# Patient Record
Sex: MALE | Race: WHITE | NOT HISPANIC OR LATINO | URBAN - NONMETROPOLITAN AREA
[De-identification: names, ages, dates, MRNs, and addresses within clinical notes are randomized per-mention and may not be internally consistent; named-entity substitution may affect disease eponyms.]

---

## 2022-05-27 ENCOUNTER — APPOINTMENT (OUTPATIENT)
Dept: ULTRASOUND IMAGING | Facility: HOSPITAL | Age: 27
End: 2022-05-27

## 2022-05-27 ENCOUNTER — HOSPITAL ENCOUNTER (EMERGENCY)
Facility: HOSPITAL | Age: 27
Discharge: HOME | End: 2022-05-27
Attending: STUDENT IN AN ORGANIZED HEALTH CARE EDUCATION/TRAINING PROGRAM | Admitting: STUDENT IN AN ORGANIZED HEALTH CARE EDUCATION/TRAINING PROGRAM

## 2022-05-27 VITALS
DIASTOLIC BLOOD PRESSURE: 85 MMHG | HEART RATE: 86 BPM | HEIGHT: 69 IN | RESPIRATION RATE: 18 BRPM | BODY MASS INDEX: 37.03 KG/M2 | TEMPERATURE: 98.1 F | WEIGHT: 250 LBS | SYSTOLIC BLOOD PRESSURE: 134 MMHG | OXYGEN SATURATION: 96 %

## 2022-05-27 DIAGNOSIS — N50.812 PAIN IN LEFT TESTICLE: Primary | ICD-10-CM

## 2022-05-27 LAB
ANION GAP SERPL CALCULATED.3IONS-SCNC: 12 MMOL/L (ref 5–15)
BASOPHILS # BLD AUTO: 0.05 10*3/MM3 (ref 0–0.2)
BASOPHILS NFR BLD AUTO: 0.5 % (ref 0–1.5)
BILIRUB UR QL STRIP: NEGATIVE
BUN SERPL-MCNC: 18 MG/DL (ref 6–20)
BUN/CREAT SERPL: 17.5 (ref 7–25)
CALCIUM SPEC-SCNC: 9.5 MG/DL (ref 8.6–10.5)
CHLORIDE SERPL-SCNC: 104 MMOL/L (ref 98–107)
CLARITY UR: ABNORMAL
CO2 SERPL-SCNC: 25 MMOL/L (ref 22–29)
COLOR UR: YELLOW
CREAT SERPL-MCNC: 1.03 MG/DL (ref 0.76–1.27)
DEPRECATED RDW RBC AUTO: 38.6 FL (ref 37–54)
EGFRCR SERPLBLD CKD-EPI 2021: 101.5 ML/MIN/1.73
EOSINOPHIL # BLD AUTO: 0.25 10*3/MM3 (ref 0–0.4)
EOSINOPHIL NFR BLD AUTO: 2.4 % (ref 0.3–6.2)
ERYTHROCYTE [DISTWIDTH] IN BLOOD BY AUTOMATED COUNT: 12.8 % (ref 12.3–15.4)
GLUCOSE SERPL-MCNC: 95 MG/DL (ref 65–99)
GLUCOSE UR STRIP-MCNC: NEGATIVE MG/DL
HCT VFR BLD AUTO: 47.2 % (ref 37.5–51)
HGB BLD-MCNC: 16.2 G/DL (ref 13–17.7)
HGB UR QL STRIP.AUTO: NEGATIVE
HOLD SPECIMEN: NORMAL
IMM GRANULOCYTES # BLD AUTO: 0.04 10*3/MM3 (ref 0–0.05)
IMM GRANULOCYTES NFR BLD AUTO: 0.4 % (ref 0–0.5)
KETONES UR QL STRIP: NEGATIVE
LEUKOCYTE ESTERASE UR QL STRIP.AUTO: NEGATIVE
LYMPHOCYTES # BLD AUTO: 3.4 10*3/MM3 (ref 0.7–3.1)
LYMPHOCYTES NFR BLD AUTO: 32.1 % (ref 19.6–45.3)
MCH RBC QN AUTO: 28.7 PG (ref 26.6–33)
MCHC RBC AUTO-ENTMCNC: 34.3 G/DL (ref 31.5–35.7)
MCV RBC AUTO: 83.7 FL (ref 79–97)
MONOCYTES # BLD AUTO: 0.78 10*3/MM3 (ref 0.1–0.9)
MONOCYTES NFR BLD AUTO: 7.4 % (ref 5–12)
NEUTROPHILS NFR BLD AUTO: 57.2 % (ref 42.7–76)
NEUTROPHILS NFR BLD AUTO: 6.08 10*3/MM3 (ref 1.7–7)
NITRITE UR QL STRIP: NEGATIVE
NRBC BLD AUTO-RTO: 0 /100 WBC (ref 0–0.2)
PH UR STRIP.AUTO: 7.5 [PH] (ref 5–9)
PLATELET # BLD AUTO: 325 10*3/MM3 (ref 140–450)
PMV BLD AUTO: 10.2 FL (ref 6–12)
POTASSIUM SERPL-SCNC: 3.8 MMOL/L (ref 3.5–5.2)
PROT UR QL STRIP: NEGATIVE
RBC # BLD AUTO: 5.64 10*6/MM3 (ref 4.14–5.8)
SODIUM SERPL-SCNC: 141 MMOL/L (ref 136–145)
SP GR UR STRIP: 1.02 (ref 1–1.03)
UROBILINOGEN UR QL STRIP: ABNORMAL
WBC NRBC COR # BLD: 10.6 10*3/MM3 (ref 3.4–10.8)

## 2022-05-27 PROCEDURE — 93976 VASCULAR STUDY: CPT

## 2022-05-27 PROCEDURE — 87491 CHLMYD TRACH DNA AMP PROBE: CPT | Performed by: STUDENT IN AN ORGANIZED HEALTH CARE EDUCATION/TRAINING PROGRAM

## 2022-05-27 PROCEDURE — 87661 TRICHOMONAS VAGINALIS AMPLIF: CPT | Performed by: STUDENT IN AN ORGANIZED HEALTH CARE EDUCATION/TRAINING PROGRAM

## 2022-05-27 PROCEDURE — 96374 THER/PROPH/DIAG INJ IV PUSH: CPT

## 2022-05-27 PROCEDURE — 87591 N.GONORRHOEAE DNA AMP PROB: CPT | Performed by: STUDENT IN AN ORGANIZED HEALTH CARE EDUCATION/TRAINING PROGRAM

## 2022-05-27 PROCEDURE — 99283 EMERGENCY DEPT VISIT LOW MDM: CPT

## 2022-05-27 PROCEDURE — 85025 COMPLETE CBC W/AUTO DIFF WBC: CPT | Performed by: STUDENT IN AN ORGANIZED HEALTH CARE EDUCATION/TRAINING PROGRAM

## 2022-05-27 PROCEDURE — 96375 TX/PRO/DX INJ NEW DRUG ADDON: CPT

## 2022-05-27 PROCEDURE — 76870 US EXAM SCROTUM: CPT

## 2022-05-27 PROCEDURE — 25010000002 MORPHINE PER 10 MG: Performed by: STUDENT IN AN ORGANIZED HEALTH CARE EDUCATION/TRAINING PROGRAM

## 2022-05-27 PROCEDURE — 80048 BASIC METABOLIC PNL TOTAL CA: CPT | Performed by: STUDENT IN AN ORGANIZED HEALTH CARE EDUCATION/TRAINING PROGRAM

## 2022-05-27 PROCEDURE — 25010000002 ONDANSETRON PER 1 MG: Performed by: STUDENT IN AN ORGANIZED HEALTH CARE EDUCATION/TRAINING PROGRAM

## 2022-05-27 PROCEDURE — 81003 URINALYSIS AUTO W/O SCOPE: CPT

## 2022-05-27 RX ORDER — ONDANSETRON 2 MG/ML
4 INJECTION INTRAMUSCULAR; INTRAVENOUS ONCE
Status: COMPLETED | OUTPATIENT
Start: 2022-05-27 | End: 2022-05-27

## 2022-05-27 RX ADMIN — MORPHINE SULFATE 4 MG: 4 INJECTION INTRAVENOUS at 22:19

## 2022-05-27 RX ADMIN — ONDANSETRON 4 MG: 2 INJECTION INTRAMUSCULAR; INTRAVENOUS at 22:19

## 2022-05-28 LAB
C TRACH RRNA CVX QL NAA+PROBE: NEGATIVE
N GONORRHOEA RRNA SPEC QL NAA+PROBE: NEGATIVE

## 2022-05-28 NOTE — ED NOTES
"Pt reports today right testicle began hurting but right now is more of the left; pt reports he had an US in [place] \"and was told had a valve stuck open and sterile for the time being\"; pt does report erectile dysfunction. Pt denies fever, chills, n/v/d at this time.   "

## 2022-05-28 NOTE — ED PROVIDER NOTES
Subjective   27-year-old male comes to the ER with a 1 year history of intermittent left testicular pain and swelling.  He also has had pain on the right side as well.  Today the pain became unbearable.  It is sharp.  He denies urinary symptoms.  Patient is sexually active and has had 3 female partners over the last 6 months.  He engages in unprotected oral, anal, vaginal sex.  No fevers or chills.  He denies other symptoms.      History provided by:  Patient   used: No        Review of Systems   Constitutional: Negative for chills and fever.   HENT: Negative for drooling.    Eyes: Negative for redness.   Respiratory: Negative for shortness of breath.    Cardiovascular: Negative for chest pain.   Gastrointestinal: Negative for abdominal pain, nausea and vomiting.   Genitourinary: Positive for scrotal swelling and testicular pain. Negative for decreased urine volume, dysuria, flank pain, hematuria, penile discharge, penile pain, penile swelling and urgency.   Skin: Negative for color change.   Neurological: Negative for seizures.   Psychiatric/Behavioral: Negative for confusion.       History reviewed. No pertinent past medical history.    No Known Allergies    History reviewed. No pertinent surgical history.    History reviewed. No pertinent family history.    Social History     Socioeconomic History   • Marital status:    Tobacco Use   • Smoking status: Never Smoker   • Smokeless tobacco: Current User   • Tobacco comment: nictine pouches   Substance and Sexual Activity   • Drug use: Yes     Types: Marijuana           Objective    Vitals:    05/27/22 2102 05/27/22 2132 05/27/22 2145 05/27/22 2247   BP: 129/63 124/77  131/72   BP Location:       Patient Position:       Pulse: 84 80 90 94   Resp:    18   Temp:       TempSrc:       SpO2: 99% 97% 98% 96%   Weight:       Height:           Physical Exam  Vitals and nursing note reviewed.   Constitutional:       General: He is not in acute  distress.     Appearance: He is well-developed. He is not diaphoretic.   HENT:      Head: Normocephalic.   Eyes:      Conjunctiva/sclera: Conjunctivae normal.   Pulmonary:      Effort: Pulmonary effort is normal. No accessory muscle usage or respiratory distress.   Genitourinary:     Penis: Uncircumcised.       Testes:         Right: Tenderness or swelling not present.         Left: Tenderness present. Swelling not present.      Epididymis:      Right: No tenderness.      Left: Tenderness present.   Musculoskeletal:      Right lower leg: No edema.      Left lower leg: No edema.   Skin:     General: Skin is dry.      Capillary Refill: Capillary refill takes less than 2 seconds.   Neurological:      Mental Status: He is oriented to person, place, and time.      Coordination: Coordination normal.   Psychiatric:         Behavior: Behavior normal.         Procedures           ED Course      Results for orders placed or performed during the hospital encounter of 05/27/22   Urinalysis With Microscopic If Indicated (No Culture) - Urine, Clean Catch    Specimen: Urine, Clean Catch   Result Value Ref Range    Color, UA Yellow Yellow, Straw, Dark Yellow, Lilo    Appearance, UA Turbid (A) Clear    pH, UA 7.5 5.0 - 9.0    Specific Gravity, UA 1.023 1.003 - 1.030    Glucose, UA Negative Negative    Ketones, UA Negative Negative    Bilirubin, UA Negative Negative    Blood, UA Negative Negative    Protein, UA Negative Negative    Leuk Esterase, UA Negative Negative    Nitrite, UA Negative Negative    Urobilinogen, UA 0.2 E.U./dL 0.2 - 1.0 E.U./dL   Basic Metabolic Panel    Specimen: Blood   Result Value Ref Range    Glucose 95 65 - 99 mg/dL    BUN 18 6 - 20 mg/dL    Creatinine 1.03 0.76 - 1.27 mg/dL    Sodium 141 136 - 145 mmol/L    Potassium 3.8 3.5 - 5.2 mmol/L    Chloride 104 98 - 107 mmol/L    CO2 25.0 22.0 - 29.0 mmol/L    Calcium 9.5 8.6 - 10.5 mg/dL    BUN/Creatinine Ratio 17.5 7.0 - 25.0    Anion Gap 12.0 5.0 - 15.0 mmol/L     eGFR 101.5 >60.0 mL/min/1.73   CBC Auto Differential    Specimen: Blood   Result Value Ref Range    WBC 10.60 3.40 - 10.80 10*3/mm3    RBC 5.64 4.14 - 5.80 10*6/mm3    Hemoglobin 16.2 13.0 - 17.7 g/dL    Hematocrit 47.2 37.5 - 51.0 %    MCV 83.7 79.0 - 97.0 fL    MCH 28.7 26.6 - 33.0 pg    MCHC 34.3 31.5 - 35.7 g/dL    RDW 12.8 12.3 - 15.4 %    RDW-SD 38.6 37.0 - 54.0 fl    MPV 10.2 6.0 - 12.0 fL    Platelets 325 140 - 450 10*3/mm3    Neutrophil % 57.2 42.7 - 76.0 %    Lymphocyte % 32.1 19.6 - 45.3 %    Monocyte % 7.4 5.0 - 12.0 %    Eosinophil % 2.4 0.3 - 6.2 %    Basophil % 0.5 0.0 - 1.5 %    Immature Grans % 0.4 0.0 - 0.5 %    Neutrophils, Absolute 6.08 1.70 - 7.00 10*3/mm3    Lymphocytes, Absolute 3.40 (H) 0.70 - 3.10 10*3/mm3    Monocytes, Absolute 0.78 0.10 - 0.90 10*3/mm3    Eosinophils, Absolute 0.25 0.00 - 0.40 10*3/mm3    Basophils, Absolute 0.05 0.00 - 0.20 10*3/mm3    Immature Grans, Absolute 0.04 0.00 - 0.05 10*3/mm3    nRBC 0.0 0.0 - 0.2 /100 WBC   Gold Top - SST   Result Value Ref Range    Extra Tube Hold for add-ons.      US Testicular or Ovarian Vascular Limited   Final Result   1. Findings concerning for decreased flow to the left testicle   suggesting a potential left-sided testicular torsion.   2. Small bilateral hydroceles.      Findings discussed with Dr. Jose Engel on date of exam at   10:53 PM      Electronically signed by:  Marino Murcia MD  5/27/2022 10:54 PM CDT   Workstation: [id number]      US Scrotum & Testicles   Final Result   1. Findings concerning for decreased flow to the left testicle   suggesting a potential left-sided testicular torsion.   2. Small bilateral hydroceles.      Findings discussed with Dr. Jose Engel on date of exam at   10:53 PM      Electronically signed by:  Marino Murcia MD  5/27/2022 10:54 PM CDT   Workstation: [id number]                                                   MDM  Number of Diagnoses or Management Options  Pain in left testicle: new and  requires workup  Diagnosis management comments: Vital signs are stable, afebrile.  Labs are unremarkable.  STD panel is pending.  Ultrasound shows normal testicular size, but there is concern for decreased blood flow to the left testicle suggesting a partial left-sided torsion.  Patient's pain has improved while in the ER.  Spoke with urology recommend follow-up outpatient in the clinic.  Updated the patient and recommend PCP and urology follow-up.  Strict return precautions provided.  Patient states understanding and is agreeable to the plan.      Final diagnoses:   Pain in left testicle       ED Disposition  ED Disposition     ED Disposition   Discharge    Condition   Stable    Comment   --             Kaylin Guevara, APRN  [address] [id number] [phone]    Schedule an appointment as soon as possible for a visit in 2 days  ER follow up    Rebecca, Manuel CLANCY MD  [address] [address] [phone]    Schedule an appointment as soon as possible for a visit in 2 days  ER follow up         Medication List      No changes were made to your prescriptions during this visit.          Jose Engel MD  05/27/22 1219

## 2022-09-06 ENCOUNTER — HOSPITAL ENCOUNTER (OUTPATIENT)
Facility: HOSPITAL | Age: 27
Setting detail: OBSERVATION
Discharge: HOME | End: 2022-09-08
Attending: FAMILY MEDICINE | Admitting: INTERNAL MEDICINE

## 2022-09-06 ENCOUNTER — APPOINTMENT (OUTPATIENT)
Dept: CT IMAGING | Facility: HOSPITAL | Age: 27
End: 2022-09-06

## 2022-09-06 DIAGNOSIS — K51.00 PANCOLITIS: Primary | ICD-10-CM

## 2022-09-06 LAB
ALBUMIN SERPL-MCNC: 4.4 G/DL (ref 3.5–5.2)
ALBUMIN/GLOB SERPL: 1.3 G/DL
ALP SERPL-CCNC: 72 U/L (ref 39–117)
ALT SERPL W P-5'-P-CCNC: 21 U/L (ref 1–41)
ANION GAP SERPL CALCULATED.3IONS-SCNC: 11 MMOL/L (ref 5–15)
AST SERPL-CCNC: 18 U/L (ref 1–40)
BASOPHILS # BLD AUTO: 0.05 10*3/MM3 (ref 0–0.2)
BASOPHILS NFR BLD AUTO: 0.3 % (ref 0–1.5)
BILIRUB SERPL-MCNC: 0.5 MG/DL (ref 0–1.2)
BUN SERPL-MCNC: 8 MG/DL (ref 6–20)
BUN/CREAT SERPL: 8.1 (ref 7–25)
CALCIUM SPEC-SCNC: 9.3 MG/DL (ref 8.6–10.5)
CHLORIDE SERPL-SCNC: 102 MMOL/L (ref 98–107)
CO2 SERPL-SCNC: 24 MMOL/L (ref 22–29)
CREAT SERPL-MCNC: 0.99 MG/DL (ref 0.76–1.27)
D-LACTATE SERPL-SCNC: 1.1 MMOL/L (ref 0.5–2)
DEPRECATED RDW RBC AUTO: 40.1 FL (ref 37–54)
EGFRCR SERPLBLD CKD-EPI 2021: 106.4 ML/MIN/1.73
EOSINOPHIL # BLD AUTO: 0 10*3/MM3 (ref 0–0.4)
EOSINOPHIL NFR BLD AUTO: 0 % (ref 0.3–6.2)
ERYTHROCYTE [DISTWIDTH] IN BLOOD BY AUTOMATED COUNT: 13.2 % (ref 12.3–15.4)
FLUAV SUBTYP SPEC NAA+PROBE: NOT DETECTED
FLUBV RNA ISLT QL NAA+PROBE: NOT DETECTED
GLOBULIN UR ELPH-MCNC: 3.5 GM/DL
GLUCOSE SERPL-MCNC: 120 MG/DL (ref 65–99)
HCT VFR BLD AUTO: 45.3 % (ref 37.5–51)
HGB BLD-MCNC: 15.8 G/DL (ref 13–17.7)
HOLD SPECIMEN: NORMAL
IMM GRANULOCYTES # BLD AUTO: 0.09 10*3/MM3 (ref 0–0.05)
IMM GRANULOCYTES NFR BLD AUTO: 0.5 % (ref 0–0.5)
LACTOFERRIN STL QL LA: POSITIVE
LIPASE SERPL-CCNC: 14 U/L (ref 13–60)
LYMPHOCYTES # BLD AUTO: 0.98 10*3/MM3 (ref 0.7–3.1)
LYMPHOCYTES NFR BLD AUTO: 5.6 % (ref 19.6–45.3)
MAGNESIUM SERPL-MCNC: 1.9 MG/DL (ref 1.6–2.6)
MCH RBC QN AUTO: 29.4 PG (ref 26.6–33)
MCHC RBC AUTO-ENTMCNC: 34.9 G/DL (ref 31.5–35.7)
MCV RBC AUTO: 84.2 FL (ref 79–97)
MONOCYTES # BLD AUTO: 1.29 10*3/MM3 (ref 0.1–0.9)
MONOCYTES NFR BLD AUTO: 7.4 % (ref 5–12)
NEUTROPHILS NFR BLD AUTO: 14.95 10*3/MM3 (ref 1.7–7)
NEUTROPHILS NFR BLD AUTO: 86.2 % (ref 42.7–76)
NRBC BLD AUTO-RTO: 0 /100 WBC (ref 0–0.2)
PLATELET # BLD AUTO: 287 10*3/MM3 (ref 140–450)
PMV BLD AUTO: 10.4 FL (ref 6–12)
POTASSIUM SERPL-SCNC: 3.7 MMOL/L (ref 3.5–5.2)
PROCALCITONIN SERPL-MCNC: 0.12 NG/ML (ref 0–0.25)
PROT SERPL-MCNC: 7.9 G/DL (ref 6–8.5)
RBC # BLD AUTO: 5.38 10*6/MM3 (ref 4.14–5.8)
SARS-COV-2 RNA PNL SPEC NAA+PROBE: NOT DETECTED
SODIUM SERPL-SCNC: 137 MMOL/L (ref 136–145)
WBC NRBC COR # BLD: 17.36 10*3/MM3 (ref 3.4–10.8)
WHOLE BLOOD HOLD COAG: NORMAL
WHOLE BLOOD HOLD SPECIMEN: NORMAL

## 2022-09-06 PROCEDURE — G0378 HOSPITAL OBSERVATION PER HR: HCPCS

## 2022-09-06 PROCEDURE — 96375 TX/PRO/DX INJ NEW DRUG ADDON: CPT

## 2022-09-06 PROCEDURE — 25010000002 KETOROLAC TROMETHAMINE PER 15 MG

## 2022-09-06 PROCEDURE — 25010000002 PIPERACILLIN SOD-TAZOBACTAM PER 1 G: Performed by: STUDENT IN AN ORGANIZED HEALTH CARE EDUCATION/TRAINING PROGRAM

## 2022-09-06 PROCEDURE — 96372 THER/PROPH/DIAG INJ SC/IM: CPT

## 2022-09-06 PROCEDURE — 36415 COLL VENOUS BLD VENIPUNCTURE: CPT

## 2022-09-06 PROCEDURE — 87045 FECES CULTURE AEROBIC BACT: CPT | Performed by: INTERNAL MEDICINE

## 2022-09-06 PROCEDURE — 25010000002 LEVOFLOXACIN PER 250 MG: Performed by: INTERNAL MEDICINE

## 2022-09-06 PROCEDURE — 25010000002 ONDANSETRON PER 1 MG: Performed by: STUDENT IN AN ORGANIZED HEALTH CARE EDUCATION/TRAINING PROGRAM

## 2022-09-06 PROCEDURE — 83690 ASSAY OF LIPASE: CPT | Performed by: FAMILY MEDICINE

## 2022-09-06 PROCEDURE — 83735 ASSAY OF MAGNESIUM: CPT

## 2022-09-06 PROCEDURE — 99284 EMERGENCY DEPT VISIT MOD MDM: CPT

## 2022-09-06 PROCEDURE — 25010000002 MORPHINE PER 10 MG: Performed by: STUDENT IN AN ORGANIZED HEALTH CARE EDUCATION/TRAINING PROGRAM

## 2022-09-06 PROCEDURE — 83993 ASSAY FOR CALPROTECTIN FECAL: CPT | Performed by: INTERNAL MEDICINE

## 2022-09-06 PROCEDURE — 96376 TX/PRO/DX INJ SAME DRUG ADON: CPT

## 2022-09-06 PROCEDURE — 74177 CT ABD & PELVIS W/CONTRAST: CPT

## 2022-09-06 PROCEDURE — 87493 C DIFF AMPLIFIED PROBE: CPT | Performed by: INTERNAL MEDICINE

## 2022-09-06 PROCEDURE — 87046 STOOL CULTR AEROBIC BACT EA: CPT | Performed by: INTERNAL MEDICINE

## 2022-09-06 PROCEDURE — 84145 PROCALCITONIN (PCT): CPT | Performed by: INTERNAL MEDICINE

## 2022-09-06 PROCEDURE — 87427 SHIGA-LIKE TOXIN AG IA: CPT | Performed by: INTERNAL MEDICINE

## 2022-09-06 PROCEDURE — 85025 COMPLETE CBC W/AUTO DIFF WBC: CPT | Performed by: FAMILY MEDICINE

## 2022-09-06 PROCEDURE — 87636 SARSCOV2 & INF A&B AMP PRB: CPT

## 2022-09-06 PROCEDURE — 83605 ASSAY OF LACTIC ACID: CPT | Performed by: STUDENT IN AN ORGANIZED HEALTH CARE EDUCATION/TRAINING PROGRAM

## 2022-09-06 PROCEDURE — 96361 HYDRATE IV INFUSION ADD-ON: CPT

## 2022-09-06 PROCEDURE — 25010000002 IOPAMIDOL 61 % SOLUTION: Performed by: STUDENT IN AN ORGANIZED HEALTH CARE EDUCATION/TRAINING PROGRAM

## 2022-09-06 PROCEDURE — 25010000002 ENOXAPARIN PER 10 MG: Performed by: INTERNAL MEDICINE

## 2022-09-06 PROCEDURE — 83630 LACTOFERRIN FECAL (QUAL): CPT | Performed by: INTERNAL MEDICINE

## 2022-09-06 PROCEDURE — C9803 HOPD COVID-19 SPEC COLLECT: HCPCS

## 2022-09-06 PROCEDURE — 80053 COMPREHEN METABOLIC PANEL: CPT | Performed by: FAMILY MEDICINE

## 2022-09-06 PROCEDURE — 87040 BLOOD CULTURE FOR BACTERIA: CPT | Performed by: STUDENT IN AN ORGANIZED HEALTH CARE EDUCATION/TRAINING PROGRAM

## 2022-09-06 RX ORDER — SODIUM CHLORIDE 0.9 % (FLUSH) 0.9 %
10 SYRINGE (ML) INJECTION AS NEEDED
Status: DISCONTINUED | OUTPATIENT
Start: 2022-09-06 | End: 2022-09-08 | Stop reason: HOSPADM

## 2022-09-06 RX ORDER — LEVOFLOXACIN 5 MG/ML
750 INJECTION, SOLUTION INTRAVENOUS EVERY 24 HOURS
Status: DISCONTINUED | OUTPATIENT
Start: 2022-09-06 | End: 2022-09-08 | Stop reason: HOSPADM

## 2022-09-06 RX ORDER — ACETAMINOPHEN 325 MG/1
650 TABLET ORAL EVERY 6 HOURS PRN
Status: DISCONTINUED | OUTPATIENT
Start: 2022-09-06 | End: 2022-09-08 | Stop reason: HOSPADM

## 2022-09-06 RX ORDER — METRONIDAZOLE 500 MG/100ML
500 INJECTION, SOLUTION INTRAVENOUS EVERY 8 HOURS
Status: DISCONTINUED | OUTPATIENT
Start: 2022-09-06 | End: 2022-09-08 | Stop reason: HOSPADM

## 2022-09-06 RX ORDER — SODIUM CHLORIDE 0.9 % (FLUSH) 0.9 %
10 SYRINGE (ML) INJECTION EVERY 12 HOURS SCHEDULED
Status: DISCONTINUED | OUTPATIENT
Start: 2022-09-06 | End: 2022-09-08 | Stop reason: HOSPADM

## 2022-09-06 RX ORDER — ONDANSETRON 2 MG/ML
4 INJECTION INTRAMUSCULAR; INTRAVENOUS ONCE
Status: COMPLETED | OUTPATIENT
Start: 2022-09-06 | End: 2022-09-06

## 2022-09-06 RX ORDER — KETOROLAC TROMETHAMINE 30 MG/ML
30 INJECTION, SOLUTION INTRAMUSCULAR; INTRAVENOUS ONCE
Status: COMPLETED | OUTPATIENT
Start: 2022-09-06 | End: 2022-09-06

## 2022-09-06 RX ORDER — ENOXAPARIN SODIUM 100 MG/ML
40 INJECTION SUBCUTANEOUS NIGHTLY
Status: DISCONTINUED | OUTPATIENT
Start: 2022-09-06 | End: 2022-09-08 | Stop reason: HOSPADM

## 2022-09-06 RX ORDER — MORPHINE SULFATE 2 MG/ML
2 INJECTION, SOLUTION INTRAMUSCULAR; INTRAVENOUS EVERY 4 HOURS PRN
Status: DISCONTINUED | OUTPATIENT
Start: 2022-09-06 | End: 2022-09-08 | Stop reason: HOSPADM

## 2022-09-06 RX ORDER — MORPHINE SULFATE 2 MG/ML
2 INJECTION, SOLUTION INTRAMUSCULAR; INTRAVENOUS ONCE
Status: COMPLETED | OUTPATIENT
Start: 2022-09-06 | End: 2022-09-06

## 2022-09-06 RX ORDER — SODIUM CHLORIDE, SODIUM LACTATE, POTASSIUM CHLORIDE, CALCIUM CHLORIDE 600; 310; 30; 20 MG/100ML; MG/100ML; MG/100ML; MG/100ML
125 INJECTION, SOLUTION INTRAVENOUS CONTINUOUS
Status: DISCONTINUED | OUTPATIENT
Start: 2022-09-06 | End: 2022-09-08 | Stop reason: HOSPADM

## 2022-09-06 RX ORDER — TRAMADOL HYDROCHLORIDE 50 MG/1
50 TABLET ORAL EVERY 6 HOURS PRN
Status: DISCONTINUED | OUTPATIENT
Start: 2022-09-06 | End: 2022-09-08 | Stop reason: HOSPADM

## 2022-09-06 RX ADMIN — MORPHINE SULFATE 2 MG: 2 INJECTION, SOLUTION INTRAMUSCULAR; INTRAVENOUS at 20:57

## 2022-09-06 RX ADMIN — ACETAMINOPHEN 650 MG: 325 TABLET, FILM COATED ORAL at 21:47

## 2022-09-06 RX ADMIN — ONDANSETRON 4 MG: 2 INJECTION INTRAMUSCULAR; INTRAVENOUS at 18:23

## 2022-09-06 RX ADMIN — KETOROLAC TROMETHAMINE 30 MG: 30 INJECTION, SOLUTION INTRAMUSCULAR at 18:24

## 2022-09-06 RX ADMIN — Medication 10 ML: at 21:49

## 2022-09-06 RX ADMIN — SODIUM CHLORIDE, POTASSIUM CHLORIDE, SODIUM LACTATE AND CALCIUM CHLORIDE 125 ML/HR: 600; 310; 30; 20 INJECTION, SOLUTION INTRAVENOUS at 21:48

## 2022-09-06 RX ADMIN — ENOXAPARIN SODIUM 40 MG: 40 INJECTION SUBCUTANEOUS at 21:58

## 2022-09-06 RX ADMIN — LEVOFLOXACIN 750 MG: 5 INJECTION, SOLUTION INTRAVENOUS at 22:59

## 2022-09-06 RX ADMIN — SODIUM CHLORIDE, POTASSIUM CHLORIDE, SODIUM LACTATE AND CALCIUM CHLORIDE 1000 ML: 600; 310; 30; 20 INJECTION, SOLUTION INTRAVENOUS at 18:23

## 2022-09-06 RX ADMIN — IOPAMIDOL 90 ML: 612 INJECTION, SOLUTION INTRAVENOUS at 18:12

## 2022-09-06 RX ADMIN — MORPHINE SULFATE 2 MG: 2 INJECTION, SOLUTION INTRAMUSCULAR; INTRAVENOUS at 18:24

## 2022-09-06 RX ADMIN — SODIUM CHLORIDE, POTASSIUM CHLORIDE, SODIUM LACTATE AND CALCIUM CHLORIDE 1000 ML: 600; 310; 30; 20 INJECTION, SOLUTION INTRAVENOUS at 21:48

## 2022-09-06 RX ADMIN — METRONIDAZOLE 500 MG: 500 INJECTION, SOLUTION INTRAVENOUS at 22:59

## 2022-09-06 NOTE — ED NOTES
Per [facility], the patient is having LLQ pain and low grade fever. This AM the patient called to let work know he would be running late and it was 5 hours before he was supposed to be there.

## 2022-09-06 NOTE — ED PROVIDER NOTES
"Subjective   PIT    27 year old male patient presents to the ER with complaint of abdominal pain with diarrhea for past two days, worse today. He reports that he has lower abdominal pain and that when he cramps it is a 10/10 and he feels like he could pass out. He had a negative home COVID test today. He has also had nausea and vomiting he reports when the pain is bad. He has taken peptobismul and gas x without relief. He also has had chills, fever, body aches, back pain, and dizziness. He smokes half pack of cigarettes a day, ETOH socially, marijuana daily.           Review of Systems   Constitutional: Positive for chills and fever.   HENT: Negative.    Eyes: Negative.    Respiratory: Negative.    Cardiovascular: Negative.    Gastrointestinal: Positive for abdominal pain, diarrhea, nausea and vomiting.   Endocrine: Negative.    Genitourinary: Negative.    Musculoskeletal: Positive for back pain and myalgias.   Skin: Negative.    Allergic/Immunologic: Negative.    Neurological: Negative.    Hematological: Negative.    Psychiatric/Behavioral: Negative.        History reviewed. No pertinent past medical history.    No Known Allergies    History reviewed. No pertinent surgical history.    History reviewed. No pertinent family history.    Social History     Socioeconomic History   • Marital status:    Tobacco Use   • Smoking status: Never Smoker   • Smokeless tobacco: Current User   • Tobacco comment: nictine pouches   Substance and Sexual Activity   • Drug use: Yes     Types: Marijuana           Objective    /70 (BP Location: Right arm, Patient Position: Lying)   Pulse 102   Temp 99.8 °F (37.7 °C) (Oral)   Resp 16   Ht 175.3 cm (69\")   Wt 108 kg (239 lb)   SpO2 93%   BMI 35.29 kg/m²     Physical Exam  Vitals and nursing note reviewed.   Constitutional:       General: He is not in acute distress.     Appearance: Normal appearance. He is ill-appearing. He is not toxic-appearing or diaphoretic. "   HENT:      Head: Normocephalic.      Nose: Nose normal.      Mouth/Throat:      Mouth: Mucous membranes are moist.   Eyes:      Pupils: Pupils are equal, round, and reactive to light.   Cardiovascular:      Rate and Rhythm: Regular rhythm. Tachycardia present.      Pulses: Normal pulses.      Heart sounds: Normal heart sounds.   Pulmonary:      Effort: Pulmonary effort is normal. No respiratory distress.      Breath sounds: Normal breath sounds. No wheezing.   Abdominal:      General: Bowel sounds are normal. There is no distension.      Palpations: Abdomen is soft.      Tenderness: There is abdominal tenderness in the suprapubic area and left lower quadrant. There is left CVA tenderness. There is no right CVA tenderness.   Musculoskeletal:         General: Normal range of motion.      Cervical back: Normal range of motion.   Skin:     General: Skin is warm and dry.      Capillary Refill: Capillary refill takes less than 2 seconds.   Neurological:      Mental Status: He is alert and oriented to person, place, and time.   Psychiatric:         Mood and Affect: Mood normal.         Behavior: Behavior normal.         Thought Content: Thought content normal.         Judgment: Judgment normal.         Procedures           ED Course  ED Course as of 09/06/22 2011   Tue Sep 06, 2022   2003 Spoke with Dr. Cobos who agrees with consult. [HS]      ED Course User Index  [HS] Jocelyne Holguin, APRN      Results for orders placed or performed during the hospital encounter of 09/06/22   COVID-19 and FLU A/B PCR - Swab, Nasopharynx    Specimen: Nasopharynx; Swab   Result Value Ref Range    COVID19 Not Detected Not Detected - Ref. Range    Influenza A PCR Not Detected Not Detected    Influenza B PCR Not Detected Not Detected   Comprehensive Metabolic Panel    Specimen: Blood   Result Value Ref Range    Glucose 120 (H) 65 - 99 mg/dL    BUN 8 6 - 20 mg/dL    Creatinine 0.99 0.76 - 1.27 mg/dL    Sodium 137 136 - 145 mmol/L     Potassium 3.7 3.5 - 5.2 mmol/L    Chloride 102 98 - 107 mmol/L    CO2 24.0 22.0 - 29.0 mmol/L    Calcium 9.3 8.6 - 10.5 mg/dL    Total Protein 7.9 6.0 - 8.5 g/dL    Albumin 4.40 3.50 - 5.20 g/dL    ALT (SGPT) 21 1 - 41 U/L    AST (SGOT) 18 1 - 40 U/L    Alkaline Phosphatase 72 39 - 117 U/L    Total Bilirubin 0.5 0.0 - 1.2 mg/dL    Globulin 3.5 gm/dL    A/G Ratio 1.3 g/dL    BUN/Creatinine Ratio 8.1 7.0 - 25.0    Anion Gap 11.0 5.0 - 15.0 mmol/L    eGFR 106.4 >60.0 mL/min/1.73   Lipase    Specimen: Blood   Result Value Ref Range    Lipase 14 13 - 60 U/L   CBC Auto Differential    Specimen: Blood   Result Value Ref Range    WBC 17.36 (H) 3.40 - 10.80 10*3/mm3    RBC 5.38 4.14 - 5.80 10*6/mm3    Hemoglobin 15.8 13.0 - 17.7 g/dL    Hematocrit 45.3 37.5 - 51.0 %    MCV 84.2 79.0 - 97.0 fL    MCH 29.4 26.6 - 33.0 pg    MCHC 34.9 31.5 - 35.7 g/dL    RDW 13.2 12.3 - 15.4 %    RDW-SD 40.1 37.0 - 54.0 fl    MPV 10.4 6.0 - 12.0 fL    Platelets 287 140 - 450 10*3/mm3    Neutrophil % 86.2 (H) 42.7 - 76.0 %    Lymphocyte % 5.6 (L) 19.6 - 45.3 %    Monocyte % 7.4 5.0 - 12.0 %    Eosinophil % 0.0 (L) 0.3 - 6.2 %    Basophil % 0.3 0.0 - 1.5 %    Immature Grans % 0.5 0.0 - 0.5 %    Neutrophils, Absolute 14.95 (H) 1.70 - 7.00 10*3/mm3    Lymphocytes, Absolute 0.98 0.70 - 3.10 10*3/mm3    Monocytes, Absolute 1.29 (H) 0.10 - 0.90 10*3/mm3    Eosinophils, Absolute 0.00 0.00 - 0.40 10*3/mm3    Basophils, Absolute 0.05 0.00 - 0.20 10*3/mm3    Immature Grans, Absolute 0.09 (H) 0.00 - 0.05 10*3/mm3    nRBC 0.0 0.0 - 0.2 /100 WBC   Magnesium    Specimen: Blood   Result Value Ref Range    Magnesium 1.9 1.6 - 2.6 mg/dL   Green Top (Gel)   Result Value Ref Range    Extra Tube Hold for add-ons.    Lavender Top   Result Value Ref Range    Extra Tube hold for add-on    Gold Top - SST   Result Value Ref Range    Extra Tube Hold for add-ons.    Light Blue Top   Result Value Ref Range    Extra Tube Hold for add-ons.    Gray Top   Result Value Ref  Range    Extra Tube Hold for add-ons.      CT Abdomen Pelvis With Contrast    Result Date: 9/6/2022  EXAM: CT ABDOMEN PELVIS WITH IV CONTRAST ORDERING PROVIDER: MYRNA ARGUETA CLINICAL HISTORY: Pain in left lower quadrant, diarrhea COMPARISON: TECHNIQUE: CT abdomen and pelvis performed with 90ml of Isovue-300 as IV contrast and reformatted in the sagittal and coronal planes. This examination was performed according to our departmental dose optimization program which includes automated exposure control, adjustment of the MA and kV according to patient size, and/or use of iterative reconstruction technique. FINDINGS: BASILAR CHEST: No consolidation, nodule or effusion. LIVER: No mass, enlargement or abnormal density. Diffuse fatty change of liver. BILIARY TRACT: Unremarkable gallbladder. SPLEEN: No mass or enlargement. PANCREAS: No mass or inflammatory process. Normal pancreatic duct. ADRENAL GLANDS: Unremarkable. No mass. URINARY SYSTEM: Kidneys are normal in size. No stone, hydronephrosis, or mass. Normal ureters.  Bladder is normal without mass or stone.  GI TRACT: Diffuse colonic wall thickening from cecum to the rectosigmoid colon concerning for nonspecific pancolitis. There is no gross abscess.  No inflamed diverticula.  No hernia. Appendix is normal.  REPRODUCTIVE SYSTEM: Unremarkable PERITONEAL SPACE:No free air, free fluid, mass or adenopathy. RETROPERITONEAL SPACE:  No adenopathy, mass, aneurysm or significant vascular abnormality. BONES AND EXTRA-ABDOMINAL SOFT TISSUES: Unremarkable.  No inguinal adenopathy or hernia.     Pancolitis. No gross abscess. Appendix is within normal. Diffuse fatty change of liver. Correlation with patient's symptoms and history is recommended. Electronically signed by:  Mu Collier MD  9/6/2022 6:33 PM CDT Workstation: [id number]                                         [facility]    Final diagnoses:   Pancolitis (HCC)       ED Disposition  ED Disposition     ED Disposition   Decision to  Admit    Condition   --    Comment   Level of Care: Med/Surg [1]   Diagnosis: Pancolitis (HCC) [925648]   Admitting Physician: BEHROOZI, SAEID [[id number]]   Attending Physician: BEHROOZI, SAEID [[id number]]               No follow-up provider specified.       Medication List      No changes were made to your prescriptions during this visit.          Jocelyne Holguin, KAITLYNN  09/06/22 2011

## 2022-09-07 LAB
ALBUMIN SERPL-MCNC: 3.7 G/DL (ref 3.5–5.2)
ALBUMIN/GLOB SERPL: 1.5 G/DL
ALP SERPL-CCNC: 53 U/L (ref 39–117)
ALT SERPL W P-5'-P-CCNC: 15 U/L (ref 1–41)
ANION GAP SERPL CALCULATED.3IONS-SCNC: 13 MMOL/L (ref 5–15)
AST SERPL-CCNC: 12 U/L (ref 1–40)
BILIRUB SERPL-MCNC: 0.4 MG/DL (ref 0–1.2)
BILIRUB UR QL STRIP: NEGATIVE
BUN SERPL-MCNC: 9 MG/DL (ref 6–20)
BUN/CREAT SERPL: 9.5 (ref 7–25)
C DIFF TOX GENS STL QL NAA+PROBE: NEGATIVE
CALCIUM SPEC-SCNC: 8.5 MG/DL (ref 8.6–10.5)
CHLORIDE SERPL-SCNC: 102 MMOL/L (ref 98–107)
CLARITY UR: CLEAR
CO2 SERPL-SCNC: 24 MMOL/L (ref 22–29)
COLOR UR: YELLOW
CREAT SERPL-MCNC: 0.95 MG/DL (ref 0.76–1.27)
D-LACTATE SERPL-SCNC: 0.8 MMOL/L (ref 0.5–2)
DEPRECATED RDW RBC AUTO: 41 FL (ref 37–54)
EGFRCR SERPLBLD CKD-EPI 2021: 111.8 ML/MIN/1.73
ERYTHROCYTE [DISTWIDTH] IN BLOOD BY AUTOMATED COUNT: 13.1 % (ref 12.3–15.4)
GLOBULIN UR ELPH-MCNC: 2.4 GM/DL
GLUCOSE SERPL-MCNC: 101 MG/DL (ref 65–99)
GLUCOSE UR STRIP-MCNC: NEGATIVE MG/DL
HBA1C MFR BLD: 5.3 % (ref 4.8–5.6)
HCT VFR BLD AUTO: 40.3 % (ref 37.5–51)
HGB BLD-MCNC: 13.7 G/DL (ref 13–17.7)
HGB UR QL STRIP.AUTO: NEGATIVE
KETONES UR QL STRIP: NEGATIVE
LEUKOCYTE ESTERASE UR QL STRIP.AUTO: NEGATIVE
MAGNESIUM SERPL-MCNC: 1.8 MG/DL (ref 1.6–2.6)
MCH RBC QN AUTO: 29.1 PG (ref 26.6–33)
MCHC RBC AUTO-ENTMCNC: 34 G/DL (ref 31.5–35.7)
MCV RBC AUTO: 85.6 FL (ref 79–97)
NITRITE UR QL STRIP: NEGATIVE
PH UR STRIP.AUTO: 6.5 [PH] (ref 5–9)
PLATELET # BLD AUTO: 227 10*3/MM3 (ref 140–450)
PMV BLD AUTO: 10.3 FL (ref 6–12)
POTASSIUM SERPL-SCNC: 3.8 MMOL/L (ref 3.5–5.2)
PROT SERPL-MCNC: 6.1 G/DL (ref 6–8.5)
PROT UR QL STRIP: NEGATIVE
RBC # BLD AUTO: 4.71 10*6/MM3 (ref 4.14–5.8)
SODIUM SERPL-SCNC: 139 MMOL/L (ref 136–145)
SP GR UR STRIP: 1.01 (ref 1–1.03)
TSH SERPL DL<=0.05 MIU/L-ACNC: 1.64 UIU/ML (ref 0.27–4.2)
UROBILINOGEN UR QL STRIP: NORMAL
WBC NRBC COR # BLD: 12.8 10*3/MM3 (ref 3.4–10.8)

## 2022-09-07 PROCEDURE — 96366 THER/PROPH/DIAG IV INF ADDON: CPT

## 2022-09-07 PROCEDURE — 96372 THER/PROPH/DIAG INJ SC/IM: CPT

## 2022-09-07 PROCEDURE — 96365 THER/PROPH/DIAG IV INF INIT: CPT

## 2022-09-07 PROCEDURE — 25010000002 MORPHINE PER 10 MG: Performed by: INTERNAL MEDICINE

## 2022-09-07 PROCEDURE — 25010000002 ENOXAPARIN PER 10 MG: Performed by: INTERNAL MEDICINE

## 2022-09-07 PROCEDURE — 83605 ASSAY OF LACTIC ACID: CPT | Performed by: INTERNAL MEDICINE

## 2022-09-07 PROCEDURE — 99244 OFF/OP CNSLTJ NEW/EST MOD 40: CPT | Performed by: INTERNAL MEDICINE

## 2022-09-07 PROCEDURE — 81003 URINALYSIS AUTO W/O SCOPE: CPT | Performed by: INTERNAL MEDICINE

## 2022-09-07 PROCEDURE — 25010000002 LEVOFLOXACIN PER 250 MG: Performed by: INTERNAL MEDICINE

## 2022-09-07 PROCEDURE — 85027 COMPLETE CBC AUTOMATED: CPT | Performed by: INTERNAL MEDICINE

## 2022-09-07 PROCEDURE — 96376 TX/PRO/DX INJ SAME DRUG ADON: CPT

## 2022-09-07 PROCEDURE — 84443 ASSAY THYROID STIM HORMONE: CPT | Performed by: INTERNAL MEDICINE

## 2022-09-07 PROCEDURE — 80053 COMPREHEN METABOLIC PANEL: CPT | Performed by: INTERNAL MEDICINE

## 2022-09-07 PROCEDURE — 96361 HYDRATE IV INFUSION ADD-ON: CPT

## 2022-09-07 PROCEDURE — 36415 COLL VENOUS BLD VENIPUNCTURE: CPT | Performed by: INTERNAL MEDICINE

## 2022-09-07 PROCEDURE — G0378 HOSPITAL OBSERVATION PER HR: HCPCS

## 2022-09-07 PROCEDURE — 83735 ASSAY OF MAGNESIUM: CPT | Performed by: INTERNAL MEDICINE

## 2022-09-07 PROCEDURE — 83036 HEMOGLOBIN GLYCOSYLATED A1C: CPT | Performed by: INTERNAL MEDICINE

## 2022-09-07 RX ORDER — ONDANSETRON 2 MG/ML
4 INJECTION INTRAMUSCULAR; INTRAVENOUS EVERY 6 HOURS PRN
Status: DISCONTINUED | OUTPATIENT
Start: 2022-09-07 | End: 2022-09-08 | Stop reason: HOSPADM

## 2022-09-07 RX ADMIN — Medication 10 ML: at 10:42

## 2022-09-07 RX ADMIN — METRONIDAZOLE 500 MG: 500 INJECTION, SOLUTION INTRAVENOUS at 05:34

## 2022-09-07 RX ADMIN — ACETAMINOPHEN 650 MG: 325 TABLET, FILM COATED ORAL at 07:03

## 2022-09-07 RX ADMIN — TRAMADOL HYDROCHLORIDE 50 MG: 50 TABLET, COATED ORAL at 10:56

## 2022-09-07 RX ADMIN — METRONIDAZOLE 500 MG: 500 INJECTION, SOLUTION INTRAVENOUS at 21:09

## 2022-09-07 RX ADMIN — Medication 10 ML: at 21:09

## 2022-09-07 RX ADMIN — SODIUM CHLORIDE, POTASSIUM CHLORIDE, SODIUM LACTATE AND CALCIUM CHLORIDE 125 ML/HR: 600; 310; 30; 20 INJECTION, SOLUTION INTRAVENOUS at 17:48

## 2022-09-07 RX ADMIN — LEVOFLOXACIN 750 MG: 5 INJECTION, SOLUTION INTRAVENOUS at 21:09

## 2022-09-07 RX ADMIN — ENOXAPARIN SODIUM 40 MG: 40 INJECTION SUBCUTANEOUS at 21:08

## 2022-09-07 RX ADMIN — MORPHINE SULFATE 2 MG: 2 INJECTION, SOLUTION INTRAMUSCULAR; INTRAVENOUS at 11:44

## 2022-09-07 RX ADMIN — METRONIDAZOLE 500 MG: 500 INJECTION, SOLUTION INTRAVENOUS at 14:53

## 2022-09-07 RX ADMIN — SODIUM CHLORIDE, POTASSIUM CHLORIDE, SODIUM LACTATE AND CALCIUM CHLORIDE 125 ML/HR: 600; 310; 30; 20 INJECTION, SOLUTION INTRAVENOUS at 10:56

## 2022-09-07 NOTE — ED NOTES
"Nursing report ED to floor  Jaylen Benson  27 y.o.  male    HPI:   Chief Complaint   Patient presents with   • Abdominal Pain   • Fever   • Vomiting   • Diarrhea       Admitting doctor:   Saeid Behroozi, MD    Consulting provider(s):  Consults     Date and Time Order Name Status Description    9/6/2022  8:08 PM Gastroenterology (on-call MD unless specified)             Admitting diagnosis:   The encounter diagnosis was Pancolitis (HCC).    Code status:   Current Code Status     Date Active Code Status Order ID Comments User Context       9/6/2022 2027 CPR (Attempt to Resuscitate) [id number]  Behroozi, Saeid, MD ED     Advance Care Planning Activity      Questions for Current Code Status     Question Answer    Code Status (Patient has no pulse and is not breathing) CPR (Attempt to Resuscitate)    Medical Interventions (Patient has pulse or is breathing) Full Support          Allergies:   Patient has no known allergies.    Intake and Output    Intake/Output Summary (Last 24 hours) at 9/6/2022 2059  Last data filed at 9/6/2022 2057  Gross per 24 hour   Intake 2100 ml   Output --   Net 2100 ml       Weight:       09/06/22  1455   Weight: 108 kg (239 lb)       Most recent vitals:   Vitals:    09/06/22 1455 09/06/22 1636 09/06/22 1928 09/06/22 1957   BP: 146/90 143/82 128/66 128/70   BP Location: Right arm Right arm Right arm Right arm   Patient Position: Sitting Lying Lying Lying   Pulse: 109   102   Resp: 20 22  16   Temp: 99.8 °F (37.7 °C)      TempSrc: Oral      SpO2: 98%   93%   Weight: 108 kg (239 lb)      Height: 175.3 cm (69\")          Active LDAs/IV Access:   Lines, Drains & Airways     Active LDAs     Name Placement date Placement time Site Days    Peripheral IV 09/06/22 1540 Right Forearm 09/06/22  1540  Forearm  less than 1                Labs (abnormal labs have a star):   Labs Reviewed   COMPREHENSIVE METABOLIC PANEL - Abnormal; Notable for the following components:       Result Value    Glucose 120 (*)  "    All other components within normal limits    Narrative:     GFR Normal >60  Chronic Kidney Disease <60  Kidney Failure <15     CBC WITH AUTO DIFFERENTIAL - Abnormal; Notable for the following components:    WBC 17.36 (*)     Neutrophil % 86.2 (*)     Lymphocyte % 5.6 (*)     Eosinophil % 0.0 (*)     Neutrophils, Absolute 14.95 (*)     Monocytes, Absolute 1.29 (*)     Immature Grans, Absolute 0.09 (*)     All other components within normal limits   COVID-19 AND FLU A/B, NP SWAB IN TRANSPORT MEDIA 8-12 HR TAT - Normal    Narrative:     Fact sheet for providers: https://www.fda.gov/media/815270/download    Fact sheet for patients: https://www.fda.gov/media/763429/download    Test performed by PCR.   LIPASE - Normal   MAGNESIUM - Normal   BLOOD CULTURE   BLOOD CULTURE   STOOL CULTURE (REFERENCE LAB)   CLOSTRIDIOIDES DIFFICILE TOXIN    Narrative:     The following orders were created for panel order Clostridioides difficile Toxin - Stool, Per Rectum.  Procedure                               Abnormality         Status                     ---------                               -----------         ------                     Clostridioides difficile...[[id number]]                                                   Please view results for these tests on the individual orders.   CLOSTRIDIOIDES DIFFICILE TOXIN, PCR   RAINBOW DRAW    Narrative:     The following orders were created for panel order [place] Draw.  Procedure                               Abnormality         Status                     ---------                               -----------         ------                     Green Top (Gel)[[id number]]                                  Final result               Lavender Top[[id number]]                                     Final result               Gold Top - SST[[id number]]                                   Final result               Light Blue Top[[id number]]                                   Final result                  Please view results for these tests on the individual orders.   LACTIC ACID, PLASMA   CALPROTECTIN, FECAL   FECAL LACTOFERRIN QUAL.   PROCALCITONIN   URINALYSIS W/ MICROSCOPIC IF INDICATED (NO CULTURE)   CBC AND DIFFERENTIAL    Narrative:     The following orders were created for panel order CBC & Differential.  Procedure                               Abnormality         Status                     ---------                               -----------         ------                     CBC Auto Differential[[id number]]        Abnormal            Final result                 Please view results for these tests on the individual orders.   GREEN TOP   LAVENDER TOP   GOLD TOP - SST   LIGHT BLUE TOP   EXTRA TUBES    Narrative:     The following orders were created for panel order Extra Tubes.  Procedure                               Abnormality         Status                     ---------                               -----------         ------                     [place]o Top[[id number]]                                         Final result                 Please view results for these tests on the individual orders.   GRAY TOP       Meds given in ED:   Medications   sodium chloride 0.9 % flush 10 mL (has no administration in time range)   piperacillin-tazobactam (ZOSYN) 3.375 g/100 mL 0.9% NS IVPB (mbp) (3.375 g Intravenous New Bag 9/6/22 2057)   sodium chloride 0.9 % flush 10 mL (has no administration in time range)   sodium chloride 0.9 % flush 10 mL (has no administration in time range)   Enoxaparin Sodium (LOVENOX) syringe 40 mg (has no administration in time range)   metroNIDAZOLE (FLAGYL) IVPB 500 mg (has no administration in time range)   levoFLOXacin (LEVAQUIN) 750 mg/150 mL D5W (premix) (LEVAQUIN) 750 mg (has no administration in time range)   lactated ringers bolus 1,000 mL (has no administration in time range)   lactated ringers infusion (has no administration in time range)   morphine injection 2 mg (has no  administration in time range)   acetaminophen (TYLENOL) tablet 650 mg (has no administration in time range)   traMADol (ULTRAM) tablet 50 mg (has no administration in time range)   lactated ringers bolus 1,000 mL (0 mL Intravenous Stopped 9/6/22 1927)   morphine injection 2 mg (2 mg Intravenous Given 9/6/22 1824)   ondansetron (ZOFRAN) injection 4 mg (4 mg Intravenous Given 9/6/22 1823)   ketorolac (TORADOL) injection 30 mg (30 mg Intravenous Given 9/6/22 1824)   iopamidol (ISOVUE-300) 61 % injection 90 mL (90 mL Intravenous Given by Other 9/6/22 1812)   morphine injection 2 mg (2 mg Intravenous Given 9/6/22 2057)     lactated ringers, 125 mL/hr         NIH Stroke Scale:       Isolation/Infection(s):  No active isolations   COVID (rule out), C.difficile (rule out)     COVID Testing  Collected yes  Resulted yes    Nursing report ED to floor:  Mental status: a&o x4  Ambulatory status: self  Precautions: none    ED nurse phone extentsion- [phone]

## 2022-09-07 NOTE — PROGRESS NOTES
[facility] HOSPITALIST PROGRESS NOTE     Patient Identification:  Name:  Jaylen Benson  Age:  27 y.o.  Sex:  male  :  1995  MRN:  [id number]  Visit Number:  [id number]  Primary Care Provider:  Kaylin Guevara APRN    Length of stay:  0        Subjective: Seen and evaluated with the nursing staff.  Her mother and the antibiotic bedside.  Just had one episode of vomiting.  Continues to have mild left lower quadrant abdominal pain.  Still having multiple episodes of diarrhea x5  at time of evaluation.  ----------------------------------------------------------------------------------------------------------------------  Current Hospital Meds:  enoxaparin, 40 mg, Subcutaneous, Nightly  levoFLOXacin, 750 mg, Intravenous, Q24H  metroNIDAZOLE, 500 mg, Intravenous, Q8H  sodium chloride, 10 mL, Intravenous, Q12H      lactated ringers, 125 mL/hr, Last Rate: 125 mL/hr (22 1310)      ----------------------------------------------------------------------------------------------------------------------  Vital Signs:  Temp:  [98.6 °F (37 °C)-102.1 °F (38.9 °C)] 98.6 °F (37 °C)  Heart Rate:  [] 86  Resp:  [16-22] 18  BP: ()/(59-90) 129/68      22  1455 22  0615   Weight: 108 kg (239 lb) 110 kg (242 lb)     Body mass index is 35.74 kg/m².    Intake/Output Summary (Last 24 hours) at 2022 1451  Last data filed at 2022 0800  Gross per 24 hour   Intake 2340 ml   Output --   Net 2340 ml     Diet Clear Liquid  ----------------------------------------------------------------------------------------------------------------------  Physical exam:  Constitutional:  Well-developed and well-nourished.  No respiratory distress.      HENT:  Head:  Normocephalic and atraumatic.  Mouth:  Moist mucous membranes.    Eyes:  Conjunctivae and EOM are normal.  Pupils are equal, round, and reactive to light.  No scleral icterus.    Neck:  Neck supple.  No JVD present.     Cardiovascular:  Normal rate, regular rhythm and normal heart sounds with no murmur.  Pulmonary/Chest:  No respiratory distress, no wheezes, no crackles, with normal breath sounds and good air movement.  Abdominal:  Soft.  Bowel sounds are normal.  No distension and no tenderness.   Musculoskeletal:  No edema, no tenderness, and no deformity.  No red or swollen joints anywhere.    Neurological:  Alert and oriented to person, place, and time.  No cranial nerve deficit.  No tongue deviation.  No facial droop.  No slurred speech.   Skin:  Skin is warm and dry. No rash noted. No pallor.   Peripheral vascular:  Strong pulses in all 4 extremities with no clubbing, no cyanosis, no edema.  Genitourinary: Deferred  ----------------------------------------------------------------------------------------------------------------------  Tele:    ----------------------------------------------------------------------------------------------------------------------      Results from last 7 days   Lab Units 09/07/22  0631 09/06/22  2056 09/06/22  1551   LACTATE mmol/L 0.8 1.1  --    WBC 10*3/mm3 12.80*  --  17.36*   HEMOGLOBIN g/dL 13.7  --  15.8   HEMATOCRIT % 40.3  --  45.3   MCV fL 85.6  --  84.2   MCHC g/dL 34.0  --  34.9   PLATELETS 10*3/mm3 227  --  287         Results from last 7 days   Lab Units 09/07/22  0631 09/06/22  1552   SODIUM mmol/L 139 137   POTASSIUM mmol/L 3.8 3.7   MAGNESIUM mg/dL 1.8 1.9   CHLORIDE mmol/L 102 102   CO2 mmol/L 24.0 24.0   BUN mg/dL 9 8   CREATININE mg/dL 0.95 0.99   CALCIUM mg/dL 8.5* 9.3   GLUCOSE mg/dL 101* 120*   ALBUMIN g/dL 3.70 4.40   BILIRUBIN mg/dL 0.4 0.5   ALK PHOS U/L 53 72   AST (SGOT) U/L 12 18   ALT (SGPT) U/L 15 21   Estimated Creatinine Clearance: 141.5 mL/min (by C-G formula based on SCr of 0.95 mg/dL).    No results found for: AMMONIA      No results found for: BLOODCX  No results found for: URINECX  No results found for: WOUNDCX  No results found for: STOOLCX    I have  personally looked at the labs and they are summarized above.  ----------------------------------------------------------------------------------------------------------------------  Imaging Results (Last 24 Hours)     Procedure Component Value Units Date/Time    CT Abdomen Pelvis With Contrast [[id number]] Collected: 09/06/22 1808     Updated: 09/06/22 1835    Narrative:      EXAM:  CT ABDOMEN PELVIS WITH IV CONTRAST    ORDERING PROVIDER:  MYRNA ARGUETA    CLINICAL HISTORY:  Pain in left lower quadrant, diarrhea    COMPARISON:      TECHNIQUE:   CT abdomen and pelvis performed with 90ml of Isovue-300 as IV  contrast and reformatted in the sagittal and coronal planes.     This examination was performed according to our departmental dose  optimization program which includes automated exposure control,  adjustment of the MA and kV according to patient size, and/or use  of iterative reconstruction technique.     FINDINGS:    BASILAR CHEST: No consolidation, nodule or effusion.     LIVER: No mass, enlargement or abnormal density. Diffuse fatty  change of liver.    BILIARY TRACT: Unremarkable gallbladder.     SPLEEN: No mass or enlargement.     PANCREAS: No mass or inflammatory process. Normal pancreatic  duct.     ADRENAL GLANDS: Unremarkable. No mass.     URINARY SYSTEM: Kidneys are normal in size. No stone,  hydronephrosis, or mass. Normal ureters.  Bladder is normal  without mass or stone.      GI TRACT: Diffuse colonic wall thickening from cecum to the  rectosigmoid colon concerning for nonspecific pancolitis. There  is no gross abscess.  No inflamed diverticula.  No hernia.   Appendix is normal.      REPRODUCTIVE SYSTEM: Unremarkable    PERITONEAL SPACE:No free air, free fluid, mass or adenopathy.     RETROPERITONEAL SPACE:  No adenopathy, mass, aneurysm or  significant vascular abnormality.     BONES AND EXTRA-ABDOMINAL SOFT TISSUES: Unremarkable.  No  inguinal adenopathy or hernia.      Impression:       Pancolitis.  No gross abscess.  Appendix is within normal.  Diffuse fatty change of liver.  Correlation with patient's symptoms and history is recommended.    Electronically signed by:  Mu Collier MD  9/6/2022 6:33 PM CDT  Workstation: [id number]        ----------------------------------------------------------------------------------------------------------------------  Assessment and Plan:  Sepsis- fever 102.1, tachycardia and leukocytosis  Pancolitis.  Viral versus bacterial gastroenteritis  Leukocytosis  Obesity  Fatty liver.     Plan:  Diet: Clear liquids.  Continue on IV fluid  Continue on IV Zofran as needed for nausea vomiting  Continue on IV morphine as needed for pain  IV levofloxacin/metronidazole.  GI has been consulted.  Await recommendation.  Follow-up with stool C. difficile/stool culture result.    Prophylaxis:   DVT Lovenox    Disposition:  Discharge planning-pending clinical improvement.  Of care was discussed with the patient, the mother, and T and the nursing staff.    I confirmed that the patient's Advance Care Plan is present, code status is documented, or surrogate decision maker is listed in the patient's medical record.      I have utilized all available immediate resources to obtain, update, or review the patient's current medications.     Jose Holguin MD  09/07/22     Dragon disclaimer:  Part of this note may be an electronic transcription/translation of spoken language to printed text using the Dragon Dictation System.                      14:51 CDT

## 2022-09-07 NOTE — CONSULTS
SUBJECTIVE:   9/7/2022    Name: Jaylen Benson  DOD: 1995    REASON FOR CONSULT: Pancolitis    Chief Complaint:     Chief Complaint   Patient presents with   • Abdominal Pain   • Fever   • Vomiting   • Diarrhea       Subjective     Patient is 27 y.o. male with no significant past medical history presents with generalized abdominal cramping discomfort associated with nausea, vomiting, diarrhea and fever of 2 days duration.  Patient reportedly has intermittent diarrhea for past several years which is worse with dairy intake.  Symptoms have continued despite discontinuing daily intake.  He has family history of Crohn's disease.  Noted to have pancolitis upon CT abdomen pelvis.  Also was noted to have leukocytosis.  He denied history of recent antibiotic usage, travel, sick contacts or unusual food intake.  WBC upon admission was 17.36 which has improved to 12.8 today.  He had a trial of p.o. diet she was unable to tolerate.  Patient's mother is at the bedside and requesting outpatient therapy since  is insurance acquisition through the employer is in process.  Fecal lactoferrin is positive. stool C. difficile and bacterial cultures are negative.     ROS/HISTORY/ CURRENT MEDICATIONS/OBJECTIVE/VS/PE:   Review of Systems:   Review of Systems   Constitutional: Positive for fever. Negative for chills, fatigue and unexpected weight change.   HENT: Negative for congestion, ear discharge, hearing loss, nosebleeds and sore throat.    Eyes: Negative for pain, discharge and redness.   Respiratory: Negative for cough, chest tightness, shortness of breath and wheezing.    Cardiovascular: Negative for chest pain and palpitations.   Gastrointestinal: Positive for abdominal pain, diarrhea, nausea and vomiting. Negative for abdominal distention, blood in stool and constipation.   Endocrine: Negative for cold intolerance, polydipsia, polyphagia and polyuria.   Genitourinary: Negative for dysuria, flank pain, frequency, hematuria  and urgency.   Musculoskeletal: Negative for arthralgias, back pain, joint swelling and myalgias.   Skin: Negative for color change, pallor and rash.   Neurological: Negative for tremors, seizures, syncope, weakness and headaches.   Hematological: Negative for adenopathy. Does not bruise/bleed easily.   Psychiatric/Behavioral: Negative for behavioral problems, confusion, dysphoric mood, hallucinations and suicidal ideas. The patient is not nervous/anxious.        History:   History reviewed. No pertinent past medical history.  History reviewed. No pertinent surgical history.  History reviewed. No pertinent family history.  Social History     Tobacco Use   • Smoking status: Never Smoker   • Smokeless tobacco: Current User   • Tobacco comment: angelica king   Substance Use Topics   • Drug use: Yes     Types: Marijuana     No medications prior to admission.     Allergies:  Patient has no known allergies.    I have reviewed the patient's medical history, surgical history and family history in the available medical record system.     Current Medications:     Current Facility-Administered Medications   Medication Dose Route Frequency Provider Last Rate Last Admin   • acetaminophen (TYLENOL) tablet 650 mg  650 mg Oral Q6H PRN Behroozi, Saeid, MD   650 mg at 09/07/22 0703   • Enoxaparin Sodium (LOVENOX) syringe 40 mg  40 mg Subcutaneous Nightly Behroozi, Saeid, MD   40 mg at 09/06/22 2158   • lactated ringers infusion  125 mL/hr Intravenous Continuous Behroozi, Saeid,  mL/hr at 09/07/22 1310 125 mL/hr at 09/07/22 1310   • levoFLOXacin (LEVAQUIN) 750 mg/150 mL D5W (premix) (LEVAQUIN) 750 mg  750 mg Intravenous Q24H Behroozi, Saeid, MD   750 mg at 09/06/22 2259   • metroNIDAZOLE (FLAGYL) IVPB 500 mg  500 mg Intravenous Q8H Behroozi, Saeid, MD   Stopped at 09/07/22 1046   • morphine injection 2 mg  2 mg Intravenous Q4H PRN Behroozi, Saeid, MD   2 mg at 09/07/22 1144   • ondansetron (ZOFRAN) injection 4 mg  4 mg  Intravenous Q6H PRN Jose Holguin MD       • sodium chloride 0.9 % flush 10 mL  10 mL Intravenous PRN Gama Walker MD       • sodium chloride 0.9 % flush 10 mL  10 mL Intravenous Q12H Behroozi, Saeid, MD   10 mL at 09/07/22 1042   • sodium chloride 0.9 % flush 10 mL  10 mL Intravenous PRN Behroozi, Saeid, MD       • traMADol (ULTRAM) tablet 50 mg  50 mg Oral Q6H PRN Behroozi, Saeid, MD   50 mg at 09/07/22 1056       Objective     Physical Exam:   Temp:  [98.6 °F (37 °C)-102.1 °F (38.9 °C)] 98.6 °F (37 °C)  Heart Rate:  [] 86  Resp:  [16-22] 18  BP: ()/(59-90) 129/68    Physical Exam:  General Appearance:    Alert, cooperative, in no acute distress   Head:    Normocephalic, without obvious abnormality, atraumatic   Eyes:            Lids and lashes normal, conjunctivae and sclerae normal, no   icterus, no pallor, corneas clear, PERRLA   Ears:    Ears appear intact with no abnormalities noted   Throat:   No oral lesions, no thrush, oral mucosa moist   Neck:   No adenopathy, supple, trachea midline, no thyromegaly, no     carotid bruit, no JVD   Back:     No kyphosis present, no scoliosis present, no skin lesions,       erythema or scars, no tenderness to percussion or                   palpation,   range of motion normal   Lungs:     Clear to auscultation,respirations regular, even and                   unlabored    Heart:    Regular rhythm and normal rate, normal S1 and S2, no            murmur, no gallop, no rub, no click   Breast Exam:    Deferred   Abdomen:     Normal bowel sounds, no masses, no organomegaly, soft        nontender, nondistended, no guarding, no rebound                 tenderness   Genitalia:    Deferred   Extremities:   Moves all extremities well, no edema, no cyanosis, no              redness   Pulses:   Pulses palpable and equal bilaterally   Skin:   No bleeding, bruising or rash   Lymph nodes:   No palpable adenopathy   Neurologic:   Cranial nerves 2 - 12  grossly intact, sensation intact, DTR        present and equal bilaterally      Results Review:     Lab Results   Component Value Date    WBC 12.80 (H) 09/07/2022    WBC 17.36 (H) 09/06/2022    WBC 10.60 05/27/2022    HGB 13.7 09/07/2022    HGB 15.8 09/06/2022    HGB 16.2 05/27/2022    HCT 40.3 09/07/2022    HCT 45.3 09/06/2022    HCT 47.2 05/27/2022     09/07/2022     09/06/2022     05/27/2022     Results from last 7 days   Lab Units 09/07/22  0631 09/06/22  1552   ALK PHOS U/L 53 72   ALT (SGPT) U/L 15 21   AST (SGOT) U/L 12 18     Results from last 7 days   Lab Units 09/07/22  0631 09/06/22  1552   BILIRUBIN mg/dL 0.4 0.5   ALK PHOS U/L 53 72     Lipase   Date Value Ref Range Status   09/06/2022 14 13 - 60 U/L Final     No results found for: INR      Radiology Review:  Imaging Results (Last 72 Hours)     Procedure Component Value Units Date/Time    CT Abdomen Pelvis With Contrast [[id number]] Collected: 09/06/22 1808     Updated: 09/06/22 1835    Narrative:      EXAM:  CT ABDOMEN PELVIS WITH IV CONTRAST    ORDERING PROVIDER:  MYRNA ARGUETA    CLINICAL HISTORY:  Pain in left lower quadrant, diarrhea    COMPARISON:      TECHNIQUE:   CT abdomen and pelvis performed with 90ml of Isovue-300 as IV  contrast and reformatted in the sagittal and coronal planes.     This examination was performed according to our departmental dose  optimization program which includes automated exposure control,  adjustment of the MA and kV according to patient size, and/or use  of iterative reconstruction technique.     FINDINGS:    BASILAR CHEST: No consolidation, nodule or effusion.     LIVER: No mass, enlargement or abnormal density. Diffuse fatty  change of liver.    BILIARY TRACT: Unremarkable gallbladder.     SPLEEN: No mass or enlargement.     PANCREAS: No mass or inflammatory process. Normal pancreatic  duct.     ADRENAL GLANDS: Unremarkable. No mass.     URINARY SYSTEM: Kidneys are normal in size. No  stone,  hydronephrosis, or mass. Normal ureters.  Bladder is normal  without mass or stone.      GI TRACT: Diffuse colonic wall thickening from cecum to the  rectosigmoid colon concerning for nonspecific pancolitis. There  is no gross abscess.  No inflamed diverticula.  No hernia.   Appendix is normal.      REPRODUCTIVE SYSTEM: Unremarkable    PERITONEAL SPACE:No free air, free fluid, mass or adenopathy.     RETROPERITONEAL SPACE:  No adenopathy, mass, aneurysm or  significant vascular abnormality.     BONES AND EXTRA-ABDOMINAL SOFT TISSUES: Unremarkable.  No  inguinal adenopathy or hernia.      Impression:      Pancolitis.  No gross abscess.  Appendix is within normal.  Diffuse fatty change of liver.  Correlation with patient's symptoms and history is recommended.    Electronically signed by:  Mu Collier MD  9/6/2022 6:33 PM CDT  Workstation: [id number]          I reviewed the patient's new clinical results.    I reviewed the patient's new imaging results and agree with the interpretation.     ASSESSMENT/PLAN:   ASSESSMENT: 1.  Pancolitis, likely due to infectious pathology.  Could also be due to IBD given the family history of Crohn's disease.  2.  Nausea and vomiting, likely due to colitis.  Need to rule out upper GI pathology such as gastritis, peptic ulcer disease and pancreaticobiliary pathology.  3.  Marijuana usage  PLAN: 1.  Continue bowel rest and antibiotics  2.  Initiate p.o. post improvement of abdominal pain and advance as tolerated  3.  EGD and colonoscopy as outpatient post completion of antibiotic therapy..  The risks, benefits, and alternatives of this procedure have been discussed with the patient or the responsible party. The patient understands and agrees to proceed.         Jame Cobos MD  09/07/22  14:45 CDT

## 2022-09-07 NOTE — PLAN OF CARE
Goal Outcome Evaluation:  Plan of Care Reviewed With: patient        Progress: no change  Outcome Evaluation: fever controlled with tylenol, no N/V, abd pain minimal,. patient had 2 BM overnight (loose stools).

## 2022-09-07 NOTE — H&P
[facility] Medicine Admission      Date of Admission: 9/6/2022      Primary Care Physician: Kaylin Guevara APRN      Chief Complaint: abdominal pain    HPI:  Patient is a 27-year-old male who denies any past medical history presented to ER with complaint of abdominal pain associated with diarrhea.  Underwent CT of the abdomen and pelvis which showed pancolitis.  Gastroenterology service Dr. Wade was consulted who accepted the consult.  Hospitalist service was called for admission of the patient.  Patient was seen and examined in ED room 21.  His mother at bedside.  Patient reports 2 days history of left lower quadrant moderate to severe sharp abdominal pain.  Pain is intermittent, and his pain is associated with nausea and nonbilious nonbloody vomiting.  He 5-10 times a day loose bowel movements over the last 2 days.  His BMs are nonbloody.  He had subjective fever and chills, body ache back pain and felt on 1 occasion dizzy.  He may had some weight loss.  He denies any fall injury trauma sore throat chest pain shortness of breath syncope near syncope palpitation, URI UTI-like symptoms, bleeding.  He denies having similar episodes in the past.  He denies any travel to outside the state or country.  He denies working in any sewage environment.    Concurrent Medical History:  has no past medical history on file.  Patient denies any past medical history.    Past Surgical History:  has no past surgical history on file.    Family History: family history is not on file.  There is extended family history of inflammatory bowel disease, Crohn's disease.    Social History:  reports that he has never smoked. He uses smokeless tobacco. He reports current drug use. Drug: Marijuana.    Allergies: No Known Allergies    Medications:   Prior to Admission medications    Not on File   Patient denies taking regular medication outpatient.    Review of Systems:  Review of Systems    Constitutional: Positive for activity change, chills and fever. Negative for diaphoresis and fatigue.   HENT: Negative for congestion, dental problem, ear pain, facial swelling, rhinorrhea and sinus pressure.    Eyes: Negative for photophobia, discharge, redness, itching and visual disturbance.   Respiratory: Negative for apnea, cough, choking, chest tightness, shortness of breath, wheezing and stridor.    Cardiovascular: Negative for chest pain, palpitations and leg swelling.   Gastrointestinal: Positive for diarrhea, nausea and vomiting. Negative for abdominal distention, abdominal pain, anal bleeding, blood in stool and rectal pain.   Endocrine: Negative for cold intolerance, heat intolerance, polydipsia, polyphagia and polyuria.   Genitourinary: Negative for difficulty urinating, flank pain, frequency, hematuria and urgency.   Musculoskeletal: Negative for arthralgias, back pain, joint swelling and myalgias.   Skin: Negative for pallor, rash and wound.   Allergic/Immunologic: Negative for environmental allergies and immunocompromised state.   Neurological: Negative for dizziness, tremors, seizures, facial asymmetry, speech difficulty, weakness, light-headedness, numbness and headaches.   Hematological: Negative for adenopathy. Does not bruise/bleed easily.   Psychiatric/Behavioral: Negative for agitation, behavioral problems and hallucinations. The patient is not nervous/anxious.       Otherwise complete ROS is negative except as mentioned above.    Physical Exam:   Temp:  [99.8 °F (37.7 °C)] 99.8 °F (37.7 °C)  Heart Rate:  [102-109] 102  Resp:  [16-22] 16  BP: (128-146)/(66-90) 128/70  Physical Exam  Constitutional:       General: He is not in acute distress.     Appearance: He is normal weight. He is not ill-appearing, toxic-appearing or diaphoretic.   HENT:      Head: Normocephalic and atraumatic.      Right Ear: External ear normal.      Left Ear: External ear normal.      Nose: Nose normal.       Mouth/Throat:      Mouth: Mucous membranes are moist.      Pharynx: Oropharynx is clear.   Eyes:      Extraocular Movements: Extraocular movements intact.      Conjunctiva/sclera: Conjunctivae normal.      Pupils: Pupils are equal, round, and reactive to light.   Cardiovascular:      Rate and Rhythm: Regular rhythm. Tachycardia present.      Heart sounds: No murmur heard.    No friction rub. No gallop.   Pulmonary:      Effort: No respiratory distress.      Breath sounds: No stridor. No wheezing or rales.   Chest:      Chest wall: No tenderness.   Abdominal:      General: Abdomen is flat. There is no distension.      Palpations: Abdomen is soft.      Tenderness: There is no abdominal tenderness. There is no guarding or rebound.      Comments: Abdomen is soft, left lower quadrant abdominal tenderness on palpitation with no guarding or rebound.  Abdomen is not distended.  Bowel sounds present.  No flank tenderness present.   Musculoskeletal:         General: No swelling or tenderness.      Cervical back: No rigidity or tenderness.      Right lower leg: No edema.      Left lower leg: No edema.   Lymphadenopathy:      Cervical: No cervical adenopathy.   Skin:     General: Skin is warm and dry.      Coloration: Skin is not jaundiced.      Findings: No erythema.   Neurological:      Mental Status: He is alert and oriented to person, place, and time. Mental status is at baseline.      Sensory: No sensory deficit.      Motor: No weakness.      Coordination: Coordination normal.   Psychiatric:         Mood and Affect: Mood normal.         Behavior: Behavior normal.         Judgment: Judgment normal.           Results Reviewed:  I have personally reviewed current lab, radiology, and data and agree with results.  Lab Results (last 24 hours)     Procedure Component Value Units Date/Time    Extra Tubes [[id number]] Collected: 09/06/22 1553    Specimen: Blood, Venous Line Updated: 09/06/22 2002    Narrative:      The following  orders were created for panel order Extra Tubes.  Procedure                               Abnormality         Status                     ---------                               -----------         ------                     Crespo Top[[id number]]                                         Final result                 Please view results for these tests on the individual orders.    Crespo Top [[id number]] Collected: 09/06/22 1553    Specimen: Blood Updated: 09/06/22 2002     Extra Tube Hold for add-ons.     Comment: Auto resulted.       COVID-19 and FLU A/B PCR - Swab, Nasopharynx [[id number]]  (Normal) Collected: 09/06/22 1823    Specimen: Swab from Nasopharynx Updated: 09/06/22 1859     COVID19 Not Detected     Influenza A PCR Not Detected     Influenza B PCR Not Detected    Narrative:      Fact sheet for providers: https://www.fda.gov/media/524231/download    Fact sheet for patients: https://www.fda.gov/media/032048/download    Test performed by PCR.    Magnesium [[id number]]  (Normal) Collected: 09/06/22 1552    Specimen: Blood Updated: 09/06/22 1800     Magnesium 1.9 mg/dL     [place] Draw [[id number]] Collected: 09/06/22 1551    Specimen: Blood Updated: 09/06/22 1703    Narrative:      The following orders were created for panel order [place] Draw.  Procedure                               Abnormality         Status                     ---------                               -----------         ------                     Green Top (Gel)[[id number]]                                  Final result               Lavender Top[[id number]]                                     Final result               Gold Top - SST[[id number]]                                   Final result               Light Blue Top[[id number]]                                   Final result                 Please view results for these tests on the individual orders.    Green Top (Gel) [[id number]] Collected: 09/06/22 1552    Specimen: Blood Updated: 09/06/22 1703      Extra Tube Hold for add-ons.     Comment: Auto resulted.       Gold Top - SST [[id number]] Collected: 09/06/22 1552    Specimen: Blood Updated: 09/06/22 1703     Extra Tube Hold for add-ons.     Comment: Auto resulted.       Light Blue Top [[id number]] Collected: 09/06/22 1552    Specimen: Blood Updated: 09/06/22 1703     Extra Tube Hold for add-ons.     Comment: Auto resulted       Lavender Top [[id number]] Collected: 09/06/22 1551    Specimen: Blood Updated: 09/06/22 1703     Extra Tube hold for add-on     Comment: Auto resulted       Comprehensive Metabolic Panel [[id number]]  (Abnormal) Collected: 09/06/22 1552    Specimen: Blood Updated: 09/06/22 1618     Glucose 120 mg/dL      BUN 8 mg/dL      Creatinine 0.99 mg/dL      Sodium 137 mmol/L      Potassium 3.7 mmol/L      Chloride 102 mmol/L      CO2 24.0 mmol/L      Calcium 9.3 mg/dL      Total Protein 7.9 g/dL      Albumin 4.40 g/dL      ALT (SGPT) 21 U/L      AST (SGOT) 18 U/L      Alkaline Phosphatase 72 U/L      Total Bilirubin 0.5 mg/dL      Globulin 3.5 gm/dL      A/G Ratio 1.3 g/dL      BUN/Creatinine Ratio 8.1     Anion Gap 11.0 mmol/L      eGFR 106.4 mL/min/1.73      Comment: National Kidney Foundation and American Society of Nephrology (ASN) Task Force recommended calculation based on the Chronic Kidney Disease Epidemiology Collaboration (CKD-EPI) equation refit without adjustment for race.       Narrative:      GFR Normal >60  Chronic Kidney Disease <60  Kidney Failure <15      Lipase [[id number]]  (Normal) Collected: 09/06/22 1552    Specimen: Blood Updated: 09/06/22 1618     Lipase 14 U/L     CBC & Differential [[id number]]  (Abnormal) Collected: 09/06/22 1551    Specimen: Blood Updated: 09/06/22 1555    Narrative:      The following orders were created for panel order CBC & Differential.  Procedure                               Abnormality         Status                     ---------                               -----------         ------                      CBC Auto Differential[[id number]]        Abnormal            Final result                 Please view results for these tests on the individual orders.    CBC Auto Differential [[id number]]  (Abnormal) Collected: 09/06/22 1551    Specimen: Blood Updated: 09/06/22 1555     WBC 17.36 10*3/mm3      RBC 5.38 10*6/mm3      Hemoglobin 15.8 g/dL      Hematocrit 45.3 %      MCV 84.2 fL      MCH 29.4 pg      MCHC 34.9 g/dL      RDW 13.2 %      RDW-SD 40.1 fl      MPV 10.4 fL      Platelets 287 10*3/mm3      Neutrophil % 86.2 %      Lymphocyte % 5.6 %      Monocyte % 7.4 %      Eosinophil % 0.0 %      Basophil % 0.3 %      Immature Grans % 0.5 %      Neutrophils, Absolute 14.95 10*3/mm3      Lymphocytes, Absolute 0.98 10*3/mm3      Monocytes, Absolute 1.29 10*3/mm3      Eosinophils, Absolute 0.00 10*3/mm3      Basophils, Absolute 0.05 10*3/mm3      Immature Grans, Absolute 0.09 10*3/mm3      nRBC 0.0 /100 WBC         Imaging Results (Last 24 Hours)     Procedure Component Value Units Date/Time    CT Abdomen Pelvis With Contrast [[id number]] Collected: 09/06/22 1808     Updated: 09/06/22 1835    Narrative:      EXAM:  CT ABDOMEN PELVIS WITH IV CONTRAST    ORDERING PROVIDER:  MYRNA ARGUETA    CLINICAL HISTORY:  Pain in left lower quadrant, diarrhea    COMPARISON:      TECHNIQUE:   CT abdomen and pelvis performed with 90ml of Isovue-300 as IV  contrast and reformatted in the sagittal and coronal planes.     This examination was performed according to our departmental dose  optimization program which includes automated exposure control,  adjustment of the MA and kV according to patient size, and/or use  of iterative reconstruction technique.     FINDINGS:    BASILAR CHEST: No consolidation, nodule or effusion.     LIVER: No mass, enlargement or abnormal density. Diffuse fatty  change of liver.    BILIARY TRACT: Unremarkable gallbladder.     SPLEEN: No mass or enlargement.     PANCREAS: No mass or inflammatory process.  Normal pancreatic  duct.     ADRENAL GLANDS: Unremarkable. No mass.     URINARY SYSTEM: Kidneys are normal in size. No stone,  hydronephrosis, or mass. Normal ureters.  Bladder is normal  without mass or stone.      GI TRACT: Diffuse colonic wall thickening from cecum to the  rectosigmoid colon concerning for nonspecific pancolitis. There  is no gross abscess.  No inflamed diverticula.  No hernia.   Appendix is normal.      REPRODUCTIVE SYSTEM: Unremarkable    PERITONEAL SPACE:No free air, free fluid, mass or adenopathy.     RETROPERITONEAL SPACE:  No adenopathy, mass, aneurysm or  significant vascular abnormality.     BONES AND EXTRA-ABDOMINAL SOFT TISSUES: Unremarkable.  No  inguinal adenopathy or hernia.      Impression:      Pancolitis.  No gross abscess.  Appendix is within normal.  Diffuse fatty change of liver.  Correlation with patient's symptoms and history is recommended.    Electronically signed by:  Mu Collier MD  9/6/2022 6:33 PM CDT  Workstation: [id number]            Assessment:    Active Hospital Problems    Diagnosis    • Pancolitis (HCC)      # Pancolitis  # Abdominal pain nausea vomiting diarrhea secondary to above  # Leukocytosis  # Obesity   #Fatty liver disease, fatty changes of the liver on CT        Plan:  Placed on telemetry  Obtain further laboratory work-up including hemoglobin A1c level TSH free T4  Follow CBC.  Obtain blood culture  Obtain stool for culture and sensitivity, calprotectin, lactoferrin, stool for C dif, lactic acid procalcitonin level  Give IV fluid bolus  Place on continuous IV fluid  GI service was consulted in ED await for input.  Pending further evaluation as above Place on broad-spectrum IV antibiotic Flagyl and Levaquin concerning potential infectious etiology pancolitis.  Place on analgesics, supportive therapy  Maintain on clear liquid as tolerated for tonight  Comorbidities will be treated appropriately  Medically supervised weight reduction outpatient  Please see  orders for comprehensive plan      I confirmed that the patient's Advance Care Plan is present, code status is documented, or surrogate decision maker is listed in the patient's medical record.     I have utilized all available immediate resources to obtain, update, or review the patient's current medications.     I discussed the patient's findings and my recommendations with: Patient and his mother both agreed with above plan of care.      Saeid Behroozi, MD   09/06/22   20:18 CDT

## 2022-09-07 NOTE — PLAN OF CARE
Problem: Adult Inpatient Plan of Care  Goal: Plan of Care Review  Outcome: Ongoing, Progressing  Goal: Patient-Specific Goal (Individualized)  Outcome: Ongoing, Progressing  Goal: Absence of Hospital-Acquired Illness or Injury  Outcome: Ongoing, Progressing  Intervention: Identify and Manage Fall Risk  Recent Flowsheet Documentation  Taken 9/6/2022 2135 by Sohan Bauer RN  Safety Promotion/Fall Prevention:   safety round/check completed   lighting adjusted   clutter free environment maintained  Intervention: Prevent Skin Injury  Recent Flowsheet Documentation  Taken 9/6/2022 2135 by Sohan Bauer RN  Body Position: position changed independently  Intervention: Prevent and Manage VTE (Venous Thromboembolism) Risk  Recent Flowsheet Documentation  Taken 9/6/2022 2135 by Sohan Bauer RN  Activity Management: activity adjusted per tolerance  Intervention: Prevent Infection  Recent Flowsheet Documentation  Taken 9/6/2022 2135 by Sohan Bauer RN  Infection Prevention: hand hygiene promoted  Goal: Optimal Comfort and Wellbeing  Outcome: Ongoing, Progressing  Intervention: Provide Person-Centered Care  Recent Flowsheet Documentation  Taken 9/6/2022 2135 by Sohan Bauer RN  Trust Relationship/Rapport: care explained  Goal: Readiness for Transition of Care  Outcome: Ongoing, Progressing     Problem: Fluid Volume Deficit  Goal: Fluid Balance  Outcome: Ongoing, Progressing     Problem: Fever  Goal: Body Temperature in Desired Range  Outcome: Ongoing, Progressing   Goal Outcome Evaluation:

## 2022-09-07 NOTE — ED NOTES
"Nursing report ED to floor  Jaylen Benson  27 y.o.  male    HPI:   Chief Complaint   Patient presents with   • Abdominal Pain   • Fever   • Vomiting   • Diarrhea       Admitting doctor:   Saeid Behroozi, MD    Consulting provider(s):  Consults     Date and Time Order Name Status Description    9/6/2022  8:08 PM Gastroenterology (on-call MD unless specified)             Admitting diagnosis:   The encounter diagnosis was Pancolitis (HCC).    Code status:   Current Code Status     Date Active Code Status Order ID Comments User Context       Not on file    Advance Care Planning Activity          Allergies:   Patient has no known allergies.    Intake and Output    Intake/Output Summary (Last 24 hours) at 9/6/2022 2019  Last data filed at 9/6/2022 1927  Gross per 24 hour   Intake 2000 ml   Output --   Net 2000 ml       Weight:       09/06/22  1455   Weight: 108 kg (239 lb)       Most recent vitals:   Vitals:    09/06/22 1455 09/06/22 1636 09/06/22 1928 09/06/22 1957   BP: 146/90 143/82 128/66 128/70   BP Location: Right arm Right arm Right arm Right arm   Patient Position: Sitting Lying Lying Lying   Pulse: 109   102   Resp: 20 22  16   Temp: 99.8 °F (37.7 °C)      TempSrc: Oral      SpO2: 98%   93%   Weight: 108 kg (239 lb)      Height: 175.3 cm (69\")          Active LDAs/IV Access:   Lines, Drains & Airways     Active LDAs     Name Placement date Placement time Site Days    Peripheral IV 09/06/22 1540 Right Forearm 09/06/22  1540  Forearm  less than 1                Labs (abnormal labs have a star):   Labs Reviewed   COMPREHENSIVE METABOLIC PANEL - Abnormal; Notable for the following components:       Result Value    Glucose 120 (*)     All other components within normal limits    Narrative:     GFR Normal >60  Chronic Kidney Disease <60  Kidney Failure <15     CBC WITH AUTO DIFFERENTIAL - Abnormal; Notable for the following components:    WBC 17.36 (*)     Neutrophil % 86.2 (*)     Lymphocyte % 5.6 (*)     " Eosinophil % 0.0 (*)     Neutrophils, Absolute 14.95 (*)     Monocytes, Absolute 1.29 (*)     Immature Grans, Absolute 0.09 (*)     All other components within normal limits   COVID-19 AND FLU A/B, NP SWAB IN TRANSPORT MEDIA 8-12 HR TAT - Normal    Narrative:     Fact sheet for providers: https://www.fda.gov/media/121388/download    Fact sheet for patients: https://www.fda.gov/media/071407/download    Test performed by PCR.   LIPASE - Normal   MAGNESIUM - Normal   BLOOD CULTURE   BLOOD CULTURE   RAINBOW DRAW    Narrative:     The following orders were created for panel order [place] Draw.  Procedure                               Abnormality         Status                     ---------                               -----------         ------                     Green Top (Gel)[[id number]]                                  Final result               Lavender Top[[id number]]                                     Final result               Gold Top - SST[[id number]]                                   Final result               Light Blue Top[[id number]]                                   Final result                 Please view results for these tests on the individual orders.   LACTIC ACID, PLASMA   CBC AND DIFFERENTIAL    Narrative:     The following orders were created for panel order CBC & Differential.  Procedure                               Abnormality         Status                     ---------                               -----------         ------                     CBC Auto Differential[[id number]]        Abnormal            Final result                 Please view results for these tests on the individual orders.   GREEN TOP   LAVENDER TOP   GOLD TOP - SST   LIGHT BLUE TOP   EXTRA TUBES    Narrative:     The following orders were created for panel order Extra Tubes.  Procedure                               Abnormality         Status                     ---------                               -----------          ------                     Crespo Top[[id number]]                                         Final result                 Please view results for these tests on the individual orders.   GRAY TOP       Meds given in ED:   Medications   sodium chloride 0.9 % flush 10 mL (has no administration in time range)   morphine injection 2 mg (has no administration in time range)   piperacillin-tazobactam (ZOSYN) 3.375 g/100 mL 0.9% NS IVPB (mbp) (has no administration in time range)   lactated ringers bolus 1,000 mL (0 mL Intravenous Stopped 9/6/22 1927)   morphine injection 2 mg (2 mg Intravenous Given 9/6/22 1824)   ondansetron (ZOFRAN) injection 4 mg (4 mg Intravenous Given 9/6/22 1823)   ketorolac (TORADOL) injection 30 mg (30 mg Intravenous Given 9/6/22 1824)   iopamidol (ISOVUE-300) 61 % injection 90 mL (90 mL Intravenous Given by Other 9/6/22 1812)           NIH Stroke Scale:       Isolation/Infection(s):  No active isolations   COVID (rule out)     COVID Testing  Collected yes  Resulted yes    Nursing report ED to floor:  Mental status: a&o x4  Ambulatory status: independant  Precautions: none    ED nurse phone extentsiyc- [phone]

## 2022-09-07 NOTE — PLAN OF CARE
Goal Outcome Evaluation:      VSS. Pt c/o pain nausea and vomiting this am. Pt reports pain medication on an empty stomach may have caused the nausea because he had been able to tolerate PO intake up to this am. Pt vomited up tramadol pill. Medicated with IV morphine once this shift prior to lunch. Pt has reported a reduction in pain and states that it is controlled. Pt was reduced to clear liquid diet and did well with chicken broth without nausea prn medication.

## 2022-09-08 VITALS
WEIGHT: 237.6 LBS | BODY MASS INDEX: 35.19 KG/M2 | SYSTOLIC BLOOD PRESSURE: 118 MMHG | HEART RATE: 81 BPM | RESPIRATION RATE: 18 BRPM | OXYGEN SATURATION: 98 % | DIASTOLIC BLOOD PRESSURE: 64 MMHG | HEIGHT: 69 IN | TEMPERATURE: 97.7 F

## 2022-09-08 LAB
D-LACTATE SERPL-SCNC: 1.1 MMOL/L (ref 0.5–2)
HOLD SPECIMEN: NORMAL
HOLD SPECIMEN: NORMAL
WHOLE BLOOD HOLD COAG: NORMAL
WHOLE BLOOD HOLD SPECIMEN: NORMAL
WHOLE BLOOD HOLD SPECIMEN: NORMAL

## 2022-09-08 PROCEDURE — 99214 OFFICE O/P EST MOD 30 MIN: CPT | Performed by: INTERNAL MEDICINE

## 2022-09-08 PROCEDURE — G0378 HOSPITAL OBSERVATION PER HR: HCPCS

## 2022-09-08 PROCEDURE — 87040 BLOOD CULTURE FOR BACTERIA: CPT | Performed by: INTERNAL MEDICINE

## 2022-09-08 PROCEDURE — 96366 THER/PROPH/DIAG IV INF ADDON: CPT

## 2022-09-08 PROCEDURE — 96361 HYDRATE IV INFUSION ADD-ON: CPT

## 2022-09-08 PROCEDURE — 83605 ASSAY OF LACTIC ACID: CPT | Performed by: INTERNAL MEDICINE

## 2022-09-08 RX ORDER — LEVOFLOXACIN 750 MG/1
750 TABLET ORAL DAILY
Qty: 5 TABLET | Refills: 0 | Status: SHIPPED | OUTPATIENT
Start: 2022-09-08 | End: 2022-09-13

## 2022-09-08 RX ORDER — METRONIDAZOLE 500 MG/1
500 TABLET ORAL 3 TIMES DAILY
Qty: 15 TABLET | Refills: 0 | Status: SHIPPED | OUTPATIENT
Start: 2022-09-08 | End: 2022-09-13

## 2022-09-08 RX ORDER — LANOLIN ALCOHOL/MO/W.PET/CERES
3 CREAM (GRAM) TOPICAL NIGHTLY PRN
Status: DISCONTINUED | OUTPATIENT
Start: 2022-09-08 | End: 2022-09-08 | Stop reason: HOSPADM

## 2022-09-08 RX ORDER — ACETAMINOPHEN 325 MG/1
650 TABLET ORAL EVERY 6 HOURS PRN
Start: 2022-09-08

## 2022-09-08 RX ADMIN — SODIUM CHLORIDE, POTASSIUM CHLORIDE, SODIUM LACTATE AND CALCIUM CHLORIDE 125 ML/HR: 600; 310; 30; 20 INJECTION, SOLUTION INTRAVENOUS at 10:07

## 2022-09-08 RX ADMIN — METRONIDAZOLE 500 MG: 500 INJECTION, SOLUTION INTRAVENOUS at 05:43

## 2022-09-08 RX ADMIN — MELATONIN 3 MG: 3 TAB ORAL at 01:46

## 2022-09-08 NOTE — DISCHARGE SUMMARY
[facility] MEDICINE DISCHARGE SUMMARY    Patient Identification:  Name:  Jaylen Benson  Age:  27 y.o.  Sex:  male  :  1995  MRN:  [id number]  Visit Number:  [id number]    Date of Admission: 2022  Date of Discharge:  2022     PCP: Kaylin Guevara APRN    ADMISSION DIAGNOSES:  Active Hospital Problems    Diagnosis  POA   • Pancolitis (HCC) [K51.00]  Yes      Resolved Hospital Problems   No resolved problems to display.       DISCHARGE DIAGNOSIS  Active Hospital Problems    Diagnosis  POA   • Pancolitis (HCC) [K51.00]  Yes      Resolved Hospital Problems   No resolved problems to display.   Sepsis.  Viral versus bacterial gastroenteritis  Dehydration  Leukocytosis  Obesity  Fatty liver        CONSULTS   Consult Orders (all) (From admission, onward)     Start     Ordered    22  Gastroenterology (on-call MD unless specified)  Once        Specialty:  Gastroenterology  Provider:  Jame Cobos MD    22                PROCEDURES PERFORMED  None    HISTORY OF PRESENT ILLNESS:   Patient is a 27 y.o. male presented with abdominal pain.    HOSPITAL COURSE  Patient is a 27 y.o. male no past medical history presented to ER with complaint of abdominal pain associated with diarrhea.  Underwent CT of the abdomen and pelvis which showed pancolitis.  He was started on IV fluid, IV levofloxacin and metronidazole.  GI was consulted.  Patient also received Zofran.  At time of admission he was febrile and had leukocytosis.  Stool culture and lactoferrin was ordered.  Patient quickly improved.  Was started on clear liquid and advance to full liquid diet which he has tolerated.  GI saw patient and recommended follow-up as outpatient in 1 week.    VITAL SIGNS:      22  1455 22  0615 22  0603   Weight: 108 kg (239 lb) 110 kg (242 lb) 108 kg (237 lb 9.6 oz)     Vital Signs (last 24 hours)        0700   0659  07  1256   Most Recent      Temp (°F) 97.8 -  99.6    97.7 -  98.9     97.7 (36.5) 09/08 1113    Heart Rate 73 -  96    81 -  90     81 09/08 1113    Resp   18      18     18 09/08 1113    /62 -  137/73    118/64 -  143/81     118/64 09/08 1113    SpO2 (%) 95 -  98      98     98 09/08 1113          Body mass index is 35.09 kg/m².    PHYSICAL EXAM:  Constitutional:  Well-developed and well-nourished.  No respiratory distress.      HENT:  Head: Normocephalic and atraumatic.  Mouth:  Moist mucous membranes.    Eyes:  Conjunctivae and EOM are normal.  Pupils are equal, round, and reactive to light.  No scleral icterus.  Neck:  Neck supple.  No JVD present.    Cardiovascular:  Normal rate, regular rhythm and normal heart sounds with no murmur.  Pulmonary/Chest:  No respiratory distress, no wheezes, no crackles, with normal breath sounds and good air movement.  Abdominal:  Soft.  Bowel sounds are normal.  No distension and no tenderness.   Musculoskeletal:  No edema, no tenderness, and no deformity.  No red or swollen joints anywhere.    Neurological:  Alert and oriented to person, place, and time.  No cranial nerve deficit.  No tongue deviation.  No facial droop.  No slurred speech.   Skin:  Skin is warm and dry.  No rash noted.  No pallor.   Psychiatric:  Normal mood and affect.  Behavior is normal.  Judgment and thought content normal.   Peripheral vascular:  No edema and strong pulses on all 4 extremities.    Lab Results (last 72 hours)     Procedure Component Value Units Date/Time    Blood Culture - Blood, Hand, Left [[id number]]  (Normal) Collected: 09/06/22 2056    Specimen: Blood from Hand, Left Updated: 09/07/22 2102     Blood Culture No growth at 24 hours    Blood Culture - Blood, Arm, Right [[id number]]  (Normal) Collected: 09/06/22 2056    Specimen: Blood from Arm, Right Updated: 09/07/22 2102     Blood Culture No growth at 24 hours    Hemoglobin A1c [[id number]]  (Normal) Collected: 09/07/22 0631    Specimen:  Blood Updated: 09/07/22 0937     Hemoglobin A1C 5.30 %     Narrative:      Hemoglobin A1C Ranges:    Increased Risk for Diabetes  5.7% to 6.4%  Diabetes                     >= 6.5%  Diabetic Goal                < 7.0%    Comprehensive Metabolic Panel [[id number]]  (Abnormal) Collected: 09/07/22 0631    Specimen: Blood Updated: 09/07/22 0846     Glucose 101 mg/dL      BUN 9 mg/dL      Creatinine 0.95 mg/dL      Sodium 139 mmol/L      Potassium 3.8 mmol/L      Chloride 102 mmol/L      CO2 24.0 mmol/L      Calcium 8.5 mg/dL      Total Protein 6.1 g/dL      Albumin 3.70 g/dL      ALT (SGPT) 15 U/L      AST (SGOT) 12 U/L      Alkaline Phosphatase 53 U/L      Total Bilirubin 0.4 mg/dL      Globulin 2.4 gm/dL      A/G Ratio 1.5 g/dL      BUN/Creatinine Ratio 9.5     Anion Gap 13.0 mmol/L      eGFR 111.8 mL/min/1.73      Comment: National Kidney Foundation and American Society of Nephrology (ASN) Task Force recommended calculation based on the Chronic Kidney Disease Epidemiology Collaboration (CKD-EPI) equation refit without adjustment for race.       Narrative:      GFR Normal >60  Chronic Kidney Disease <60  Kidney Failure <15      Magnesium [[id number]]  (Normal) Collected: 09/07/22 0631    Specimen: Blood Updated: 09/07/22 0745     Magnesium 1.8 mg/dL     TSH [[id number]]  (Normal) Collected: 09/07/22 0631    Specimen: Blood Updated: 09/07/22 0743     TSH 1.640 uIU/mL     Lactic Acid, Plasma [[id number]]  (Normal) Collected: 09/07/22 0631    Specimen: Blood Updated: 09/07/22 0654     Lactate 0.8 mmol/L     CBC (No Diff) [[id number]]  (Abnormal) Collected: 09/07/22 0631    Specimen: Blood Updated: 09/07/22 0651     WBC 12.80 10*3/mm3      RBC 4.71 10*6/mm3      Hemoglobin 13.7 g/dL      Hematocrit 40.3 %      MCV 85.6 fL      MCH 29.1 pg      MCHC 34.0 g/dL      RDW 13.1 %      RDW-SD 41.0 fl      MPV 10.3 fL      Platelets 227 10*3/mm3     Urinalysis With Microscopic If Indicated (No Culture) - Urine, Clean Catch [[id number]]   (Normal) Collected: 09/07/22 0054    Specimen: Urine, Clean Catch Updated: 09/07/22 0058     Color, UA Yellow     Appearance, UA Clear     pH, UA 6.5     Specific Gravity, UA 1.009     Glucose, UA Negative     Ketones, UA Negative     Bilirubin, UA Negative     Blood, UA Negative     Protein, UA Negative     Leuk Esterase, UA Negative     Nitrite, UA Negative     Urobilinogen, UA 0.2 E.U./dL    Narrative:      Urine microscopic not indicated.    Clostridioides difficile Toxin - Stool, Per Rectum [[id number]]  (Normal) Collected: 09/06/22 2252    Specimen: Stool from Per Rectum Updated: 09/07/22 0000    Narrative:      The following orders were created for panel order Clostridioides difficile Toxin - Stool, Per Rectum.  Procedure                               Abnormality         Status                     ---------                               -----------         ------                     Clostridioides difficile...[[id number]]  Normal              Final result                 Please view results for these tests on the individual orders.    Clostridioides difficile Toxin, PCR - Stool, Per Rectum [[id number]]  (Normal) Collected: 09/06/22 2252    Specimen: Stool from Per Rectum Updated: 09/07/22 0000     C. Difficile Toxins by PCR Negative    Narrative:      The result indicates the absence of toxigenic C. difficile from stool specimen.     Fecal Lactoferrin Qual. - Stool, Per Rectum [[id number]]  (Abnormal) Collected: 09/06/22 2252    Specimen: Stool from Per Rectum Updated: 09/06/22 2309     Lactoferrin, Qual Positive    Calprotectin, Fecal - Stool, Per Rectum [[id number]] Collected: 09/06/22 2252    Specimen: Stool from Per Rectum Updated: 09/06/22 2252    Stool Culture (Reference Lab) - Stool, Per Rectum [[id number]] Collected: 09/06/22 2251    Specimen: Stool from Per Rectum Updated: 09/06/22 2252    Procalcitonin [[id number]]  (Normal) Collected: 09/06/22 1552    Specimen: Blood Updated: 09/06/22 2206      "Procalcitonin 0.12 ng/mL     Narrative:      As a Marker for Sepsis (Non-Neonates):    1. <0.5 ng/mL represents a low risk of severe sepsis and/or septic shock.  2. >2 ng/mL represents a high risk of severe sepsis and/or septic shock.    As a Marker for Lower Respiratory Tract Infections that require antibiotic therapy:    PCT on Admission    Antibiotic Therapy       6-12 Hrs later    >0.5                Strongly Recommended  >0.25 - <0.5        Recommended   0.1 - 0.25          Discouraged              Remeasure/reassess PCT  <0.1                Strongly Discouraged     Remeasure/reassess PCT    As 28 day mortality risk marker: \"Change in Procalcitonin Result\" (>80% or <=80%) if Day 0 (or Day 1) and Day 4 values are available. Refer to http://www.[facility]pct[facility]calculator.com    Change in PCT <=80%  A decrease of PCT levels below or equal to 80% defines a positive change in PCT test result representing a higher risk for 28-day all-cause mortality of patients diagnosed with severe sepsis for septic shock.    Change in PCT >80%  A decrease of PCT levels of more than 80% defines a negative change in PCT result representing a lower risk for 28-day all-cause mortality of patients diagnosed with severe sepsis or septic shock.       Lactic Acid, Plasma [[id number]]  (Normal) Collected: 09/06/22 2056    Specimen: Blood Updated: 09/06/22 2113     Lactate 1.1 mmol/L     Extra Tubes [[id number]] Collected: 09/06/22 1553    Specimen: Blood, Venous Line Updated: 09/06/22 2002    Narrative:      The following orders were created for panel order Extra Tubes.  Procedure                               Abnormality         Status                     ---------                               -----------         ------                     [place]respo Top[[id number]]                                         Final result                 Please view results for these tests on the individual orders.    Crespo Top [[id number]] Collected: 09/06/22 1553    " Specimen: Blood Updated: 09/06/22 2002     Extra Tube Hold for add-ons.     Comment: Auto resulted.       COVID-19 and FLU A/B PCR - Swab, Nasopharynx [[id number]]  (Normal) Collected: 09/06/22 1823    Specimen: Swab from Nasopharynx Updated: 09/06/22 1859     COVID19 Not Detected     Influenza A PCR Not Detected     Influenza B PCR Not Detected    Narrative:      Fact sheet for providers: https://www.fda.gov/media/008481/download    Fact sheet for patients: https://www.fda.gov/media/510120/download    Test performed by PCR.    Magnesium [[id number]]  (Normal) Collected: 09/06/22 1552    Specimen: Blood Updated: 09/06/22 1800     Magnesium 1.9 mg/dL     [place] Draw [[id number]] Collected: 09/06/22 1551    Specimen: Blood Updated: 09/06/22 1703    Narrative:      The following orders were created for panel order [place] Draw.  Procedure                               Abnormality         Status                     ---------                               -----------         ------                     Green Top (Gel)[[id number]]                                  Final result               Lavender Top[[id number]]                                     Final result               Gold Top - SST[[id number]]                                   Final result               Light Blue Top[[id number]]                                   Final result                 Please view results for these tests on the individual orders.    Green Top (Gel) [[id number]] Collected: 09/06/22 1552    Specimen: Blood Updated: 09/06/22 1703     Extra Tube Hold for add-ons.     Comment: Auto resulted.       Gold Top - SST [[id number]] Collected: 09/06/22 1552    Specimen: Blood Updated: 09/06/22 1703     Extra Tube Hold for add-ons.     Comment: Auto resulted.       Light Blue Top [[id number]] Collected: 09/06/22 1552    Specimen: Blood Updated: 09/06/22 1703     Extra Tube Hold for add-ons.     Comment: Auto resulted       Lavender Top [[id number]] Collected:  09/06/22 1551    Specimen: Blood Updated: 09/06/22 1703     Extra Tube hold for add-on     Comment: Auto resulted       Comprehensive Metabolic Panel [[id number]]  (Abnormal) Collected: 09/06/22 1552    Specimen: Blood Updated: 09/06/22 1618     Glucose 120 mg/dL      BUN 8 mg/dL      Creatinine 0.99 mg/dL      Sodium 137 mmol/L      Potassium 3.7 mmol/L      Chloride 102 mmol/L      CO2 24.0 mmol/L      Calcium 9.3 mg/dL      Total Protein 7.9 g/dL      Albumin 4.40 g/dL      ALT (SGPT) 21 U/L      AST (SGOT) 18 U/L      Alkaline Phosphatase 72 U/L      Total Bilirubin 0.5 mg/dL      Globulin 3.5 gm/dL      A/G Ratio 1.3 g/dL      BUN/Creatinine Ratio 8.1     Anion Gap 11.0 mmol/L      eGFR 106.4 mL/min/1.73      Comment: National Kidney Foundation and American Society of Nephrology (ASN) Task Force recommended calculation based on the Chronic Kidney Disease Epidemiology Collaboration (CKD-EPI) equation refit without adjustment for race.       Narrative:      GFR Normal >60  Chronic Kidney Disease <60  Kidney Failure <15      Lipase [[id number]]  (Normal) Collected: 09/06/22 1552    Specimen: Blood Updated: 09/06/22 1618     Lipase 14 U/L     CBC & Differential [[id number]]  (Abnormal) Collected: 09/06/22 1551    Specimen: Blood Updated: 09/06/22 1555    Narrative:      The following orders were created for panel order CBC & Differential.  Procedure                               Abnormality         Status                     ---------                               -----------         ------                     CBC Auto Differential[[id number]]        Abnormal            Final result                 Please view results for these tests on the individual orders.    CBC Auto Differential [[id number]]  (Abnormal) Collected: 09/06/22 1551    Specimen: Blood Updated: 09/06/22 1555     WBC 17.36 10*3/mm3      RBC 5.38 10*6/mm3      Hemoglobin 15.8 g/dL      Hematocrit 45.3 %      MCV 84.2 fL      MCH 29.4 pg      MCHC 34.9 g/dL       RDW 13.2 %      RDW-SD 40.1 fl      MPV 10.4 fL      Platelets 287 10*3/mm3      Neutrophil % 86.2 %      Lymphocyte % 5.6 %      Monocyte % 7.4 %      Eosinophil % 0.0 %      Basophil % 0.3 %      Immature Grans % 0.5 %      Neutrophils, Absolute 14.95 10*3/mm3      Lymphocytes, Absolute 0.98 10*3/mm3      Monocytes, Absolute 1.29 10*3/mm3      Eosinophils, Absolute 0.00 10*3/mm3      Basophils, Absolute 0.05 10*3/mm3      Immature Grans, Absolute 0.09 10*3/mm3      nRBC 0.0 /100 WBC         Imaging Results (All)     Procedure Component Value Units Date/Time    CT Abdomen Pelvis With Contrast [[id number]] Collected: 09/06/22 1808     Updated: 09/06/22 1835    Narrative:      EXAM:  CT ABDOMEN PELVIS WITH IV CONTRAST    ORDERING PROVIDER:  MYRNA ARGUETA    CLINICAL HISTORY:  Pain in left lower quadrant, diarrhea    COMPARISON:      TECHNIQUE:   CT abdomen and pelvis performed with 90ml of Isovue-300 as IV  contrast and reformatted in the sagittal and coronal planes.     This examination was performed according to our departmental dose  optimization program which includes automated exposure control,  adjustment of the MA and kV according to patient size, and/or use  of iterative reconstruction technique.     FINDINGS:    BASILAR CHEST: No consolidation, nodule or effusion.     LIVER: No mass, enlargement or abnormal density. Diffuse fatty  change of liver.    BILIARY TRACT: Unremarkable gallbladder.     SPLEEN: No mass or enlargement.     PANCREAS: No mass or inflammatory process. Normal pancreatic  duct.     ADRENAL GLANDS: Unremarkable. No mass.     URINARY SYSTEM: Kidneys are normal in size. No stone,  hydronephrosis, or mass. Normal ureters.  Bladder is normal  without mass or stone.      GI TRACT: Diffuse colonic wall thickening from cecum to the  rectosigmoid colon concerning for nonspecific pancolitis. There  is no gross abscess.  No inflamed diverticula.  No hernia.   Appendix is normal.       REPRODUCTIVE SYSTEM: Unremarkable    PERITONEAL SPACE:No free air, free fluid, mass or adenopathy.     RETROPERITONEAL SPACE:  No adenopathy, mass, aneurysm or  significant vascular abnormality.     BONES AND EXTRA-ABDOMINAL SOFT TISSUES: Unremarkable.  No  inguinal adenopathy or hernia.      Impression:      Pancolitis.  No gross abscess.  Appendix is within normal.  Diffuse fatty change of liver.  Correlation with patient's symptoms and history is recommended.    Electronically signed by:  Mu Collier MD  9/6/2022 6:33 PM CDT  Workstation: [id number]            DISCHARGE DISPOSITION   Condition at time of discharge is stable.  Patient will be discharged home in the care of the family.    DISCHARGE MEDICATIONS:     Discharge Medications      New Medications      Instructions Start Date   acetaminophen 325 MG tablet  Commonly known as: TYLENOL   650 mg, Oral, Every 6 Hours PRN      levoFLOXacin 750 MG tablet  Commonly known as: Levaquin   750 mg, Oral, Daily      metroNIDAZOLE 500 MG tablet  Commonly known as: Flagyl   500 mg, Oral, 3 Times Daily             Diet Instructions     Diet: Regular, Soft Texture; Thin Liquids, No Restrictions; Whole      Discharge Diet:  Regular  Soft Texture       Fluid Consistency: Thin Liquids, No Restrictions    Soft Options: Whole          Activity Instructions     Activity as Tolerated          Additional Instructions for the Follow-ups that You Need to Schedule     Discharge Follow-up with Specified Provider: Dr. Cobos; 1 Week   As directed      To: Dr. Cobos    Follow Up: 1 Week    Follow Up Details: F/up on pancolitis            Follow-up Information     Kaylin Guevara APRN Follow up.    Specialty: Nurse Practitioner  Contact information:  [facility] [id number] [phone]                         TEST  RESULTS PENDING AT DISCHARGE  Pending Labs     Order Current Status    Calprotectin, Fecal - Stool, Per Rectum In process    Stool Culture (Reference Lab) -  Stool, Per Rectum In process    Blood Culture - Blood, Arm, Right Preliminary result    Blood Culture - Blood, Hand, Left Preliminary result           Jose Holguin MD  09/08/22  12:55 CDT    Please note that this discharge summary required more than 30 minutes to complete.    Please send a copy of this dictation to the following providers:  Kaylin Guevara APRN Dragon disclaimer:  Part of this note may be an electronic transcription/translation of spoken language to printed text using the Dragon Dictation System.

## 2022-09-08 NOTE — PROGRESS NOTES
SUBJECTIVE:   9/8/2022  Chief Complaint:     Subjective      Patient feels better today.  Abdominal pain is improving.  Diarrhea is improving as well.  Denies nausea or vomiting.  Tolerating diet.    History:  History reviewed. No pertinent past medical history.  History reviewed. No pertinent surgical history.  History reviewed. No pertinent family history.  Social History     Tobacco Use   • Smoking status: Never Smoker   • Smokeless tobacco: Current User   • Tobacco comment: nictine pouches   Substance Use Topics   • Drug use: Yes     Types: Marijuana     No medications prior to admission.     Allergies:  Patient has no known allergies.     CURRENT MEDICATIONS/OBJECTIVE/VS/PE:     Current Medications:     Current Facility-Administered Medications   Medication Dose Route Frequency Provider Last Rate Last Admin   • acetaminophen (TYLENOL) tablet 650 mg  650 mg Oral Q6H PRN Behroozi, Saeid, MD   650 mg at 09/07/22 0703   • Enoxaparin Sodium (LOVENOX) syringe 40 mg  40 mg Subcutaneous Nightly Behroozi, Saeid, MD   40 mg at 09/07/22 2108   • lactated ringers infusion  125 mL/hr Intravenous Continuous Behroozi, Saeid,  mL/hr at 09/08/22 1219 125 mL/hr at 09/08/22 1219   • levoFLOXacin (LEVAQUIN) 750 mg/150 mL D5W (premix) (LEVAQUIN) 750 mg  750 mg Intravenous Q24H Behroozi, Saeid, MD   750 mg at 09/07/22 2109   • melatonin tablet 3 mg  3 mg Oral Nightly PRN Behroozi, Saeid, MD   3 mg at 09/08/22 0146   • metroNIDAZOLE (FLAGYL) IVPB 500 mg  500 mg Intravenous Q8H Behroozi, Saeid, MD   Stopped at 09/08/22 1011   • morphine injection 2 mg  2 mg Intravenous Q4H PRN Behroozi, Saeid, MD   2 mg at 09/07/22 1144   • ondansetron (ZOFRAN) injection 4 mg  4 mg Intravenous Q6H PRN Jose Holguin MD       • sodium chloride 0.9 % flush 10 mL  10 mL Intravenous PRN Gama Walker MD       • sodium chloride 0.9 % flush 10 mL  10 mL Intravenous Q12H Behroozi, Saeid, MD   10 mL at 09/07/22 2109   •  sodium chloride 0.9 % flush 10 mL  10 mL Intravenous PRN Behroozi, Saeid, MD       • traMADol (ULTRAM) tablet 50 mg  50 mg Oral Q6H PRN Behroozi, Saeid, MD   50 mg at 09/07/22 1056       Objective     Review of Systems:   Review of Systems   Constitutional: Negative for chills, fatigue, fever and unexpected weight change.   HENT: Negative for congestion, ear discharge, hearing loss, nosebleeds and sore throat.    Eyes: Negative for pain, discharge and redness.   Respiratory: Negative for cough, chest tightness, shortness of breath and wheezing.    Cardiovascular: Negative for chest pain and palpitations.   Gastrointestinal: Positive for abdominal pain and diarrhea. Negative for abdominal distention, blood in stool, constipation, nausea and vomiting.   Endocrine: Negative for cold intolerance, polydipsia, polyphagia and polyuria.   Genitourinary: Negative for dysuria, flank pain, frequency, hematuria and urgency.   Musculoskeletal: Negative for arthralgias, back pain, joint swelling and myalgias.   Skin: Negative for color change, pallor and rash.   Neurological: Negative for tremors, seizures, syncope, weakness and headaches.   Hematological: Negative for adenopathy. Does not bruise/bleed easily.   Psychiatric/Behavioral: Negative for behavioral problems, confusion, dysphoric mood, hallucinations and suicidal ideas. The patient is not nervous/anxious.        Physical Exam:   Temp:  [97.7 °F (36.5 °C)-99.6 °F (37.6 °C)] 97.7 °F (36.5 °C)  Heart Rate:  [73-96] 81  Resp:  [18] 18  BP: (118-143)/(64-91) 118/64     Physical Exam:  General Appearance:    Alert, cooperative, in no acute distress   Head:    Normocephalic, without obvious abnormality, atraumatic   Eyes:            Lids and lashes normal, conjunctivae and sclerae normal, no   icterus, no pallor, corneas clear, PERRLA   Ears:    Ears appear intact with no abnormalities noted   Throat:   No oral lesions, no thrush, oral mucosa moist   Neck:   No adenopathy,  supple, trachea midline, no thyromegaly, no     carotid bruit, no JVD   Back:     No kyphosis present, no scoliosis present, no skin lesions,       erythema or scars, no tenderness to percussion or                   palpation,   range of motion normal   Lungs:     Clear to auscultation,respirations regular, even and                   unlabored    Heart:    Regular rhythm and normal rate, normal S1 and S2, no            murmur, no gallop, no rub, no click   Breast Exam:    Deferred   Abdomen:     Normal bowel sounds, no masses, no organomegaly, soft        nontender, nondistended, no guarding, no rebound                 tenderness   Genitalia:    Deferred   Extremities:   Moves all extremities well, no edema, no cyanosis, no              redness   Pulses:   Pulses palpable and equal bilaterally   Skin:   No bleeding, bruising or rash   Lymph nodes:   No palpable adenopathy   Neurologic:   Cranial nerves 2 - 12 grossly intact, sensation intact, DTR        present and equal bilaterally      Results Review:     Lab Results (last 24 hours)     Procedure Component Value Units Date/Time    Blood Culture - Blood, Hand, Left [[id number]]  (Normal) Collected: 09/06/22 2056    Specimen: Blood from Hand, Left Updated: 09/07/22 2102     Blood Culture No growth at 24 hours    Blood Culture - Blood, Arm, Right [[id number]]  (Normal) Collected: 09/06/22 2056    Specimen: Blood from Arm, Right Updated: 09/07/22 2102     Blood Culture No growth at 24 hours           I reviewed the patient's new clinical results.  I reviewed the patient's new imaging results and agree with the interpretation.     ASSESSMENT/PLAN:   ASSESSMENT: 1.  Pancolitis, likely due to infectious pathology.  Could also be due to IBD given the family history of Crohn's disease.  2.  Nausea and vomiting, likely due to colitis.  Need to rule out upper GI pathology such as gastritis, peptic ulcer disease and pancreaticobiliary pathology.  3.  Marijuana usage    PLAN: 1.   Continue antibiotics  2.  Diet as tolerated.  3.  EGD and colonoscopy as outpatient post completion of antibiotic therapy..  The risks, benefits, and alternatives of this procedure have been discussed with the patient or the responsible party- the patient understands and agrees to proceed.         Jame Cobos MD  09/08/22  12:59 CDT

## 2022-09-08 NOTE — PLAN OF CARE
Problem: Adult Inpatient Plan of Care  Goal: Plan of Care Review  Outcome: Ongoing, Progressing  Goal: Patient-Specific Goal (Individualized)  Outcome: Ongoing, Progressing  Goal: Absence of Hospital-Acquired Illness or Injury  Outcome: Ongoing, Progressing  Intervention: Identify and Manage Fall Risk  Recent Flowsheet Documentation  Taken 9/7/2022 1930 by Sohan Bauer RN  Safety Promotion/Fall Prevention:   safety round/check completed   clutter free environment maintained  Intervention: Prevent and Manage VTE (Venous Thromboembolism) Risk  Recent Flowsheet Documentation  Taken 9/7/2022 1930 by Sohan Bauer RN  Activity Management: activity adjusted per tolerance  Intervention: Prevent Infection  Recent Flowsheet Documentation  Taken 9/7/2022 1930 by Sohan Bauer RN  Infection Prevention: hand hygiene promoted  Goal: Optimal Comfort and Wellbeing  Outcome: Ongoing, Progressing  Intervention: Provide Person-Centered Care  Recent Flowsheet Documentation  Taken 9/7/2022 1930 by Sohan Bauer RN  Trust Relationship/Rapport: care explained  Goal: Readiness for Transition of Care  Outcome: Ongoing, Progressing     Problem: Fluid Volume Deficit  Goal: Fluid Balance  Outcome: Ongoing, Progressing     Problem: Fever  Goal: Body Temperature in Desired Range  Outcome: Ongoing, Progressing

## 2022-09-08 NOTE — PLAN OF CARE
Goal Outcome Evaluation:  Plan of Care Reviewed With: patient        Progress: improving  Outcome Evaluation: pt afebrile,had 2 episodes of diarhea overnight, no N/V,pain minimal .

## 2022-09-11 LAB
BACTERIA SPEC AEROBE CULT: NORMAL
BACTERIA SPEC AEROBE CULT: NORMAL
CALPROTECTIN STL-MCNT: 358 UG/G (ref 0–120)

## 2022-09-12 LAB
BACTERIA SPEC CULT: NORMAL
BACTERIA SPEC CULT: NORMAL
CAMPYLOBACTER STL CULT: NORMAL
E COLI SXT STL QL IA: NEGATIVE
SALM + SHIG STL CULT: NORMAL

## 2022-09-13 LAB — BACTERIA SPEC AEROBE CULT: NORMAL

## 2022-09-15 ENCOUNTER — OFFICE VISIT (OUTPATIENT)
Dept: GASTROENTEROLOGY | Facility: CLINIC | Age: 27
End: 2022-09-15

## 2022-09-15 VITALS
HEART RATE: 73 BPM | DIASTOLIC BLOOD PRESSURE: 83 MMHG | BODY MASS INDEX: 35.28 KG/M2 | HEIGHT: 69 IN | WEIGHT: 238.2 LBS | SYSTOLIC BLOOD PRESSURE: 159 MMHG

## 2022-09-15 DIAGNOSIS — K51.00 PANCOLITIS: Primary | ICD-10-CM

## 2022-09-15 DIAGNOSIS — R11.0 NAUSEA: ICD-10-CM

## 2022-09-15 PROCEDURE — 99214 OFFICE O/P EST MOD 30 MIN: CPT | Performed by: INTERNAL MEDICINE

## 2022-09-15 RX ORDER — DICYCLOMINE HCL 20 MG
20 TABLET ORAL EVERY 6 HOURS
Qty: 120 TABLET | Refills: 5 | Status: SHIPPED | OUTPATIENT
Start: 2022-09-15 | End: 2022-10-15

## 2022-09-15 RX ORDER — DEXTROSE AND SODIUM CHLORIDE 5; .45 G/100ML; G/100ML
30 INJECTION, SOLUTION INTRAVENOUS CONTINUOUS PRN
Status: CANCELLED | OUTPATIENT
Start: 2022-10-07

## 2022-10-03 NOTE — H&P (VIEW-ONLY)
Chief Complaint   Patient presents with   • [facility] f/u      pancolitis       Subjective    Jaylen Benson is a 27 y.o. male.    History of Present Illness  Patient presented to GI clinic for follow-up visit today.  Feels better currently.  Abdominal pain is improving.  Nausea has improved as well.  Has intermittent symptoms with fever, diarrhea and fatigue.  Completed antibiotic therapy.       The following portions of the patient's history were reviewed and updated as appropriate:   History reviewed. No pertinent past medical history.  History reviewed. No pertinent surgical history.  Family History   Problem Relation Age of Onset   • Crohn's disease Paternal Grandmother        Prior to Admission medications    Medication Sig Start Date End Date Taking? Authorizing Provider   acetaminophen (TYLENOL) 325 MG tablet Take 2 tablets by mouth Every 6 (Six) Hours As Needed for Mild Pain. 9/8/22  Yes Jose Holguin MD   dicyclomine (BENTYL) 20 MG tablet Take 1 tablet by mouth Every 6 (Six) Hours for 30 days. 9/15/22 10/15/22  Jame Cobos MD     No Known Allergies  Social History     Socioeconomic History   • Marital status:    Tobacco Use   • Smoking status: Current Every Day Smoker   • Smokeless tobacco: Current User   • Tobacco comment: nictine pouches   Vaping Use   • Vaping Use: Former   • Substances: Nicotine   • Devices: Disposable, Pre-filled or refillable cartridge, Refillable tank, Pre-filled pod   Substance and Sexual Activity   • Alcohol use: Yes     Comment: socially   • Drug use: Yes     Types: Marijuana   • Sexual activity: Yes       Review of Systems  Review of Systems   Constitutional: Positive for fatigue and fever. Negative for chills and unexpected weight change.   HENT: Negative for congestion, ear discharge, hearing loss, nosebleeds and sore throat.    Eyes: Negative for pain, discharge and redness.   Respiratory: Negative for cough, chest tightness, shortness of  "breath and wheezing.    Cardiovascular: Negative for chest pain and palpitations.   Gastrointestinal: Positive for abdominal pain, diarrhea and nausea. Negative for abdominal distention, blood in stool, constipation and vomiting.   Endocrine: Negative for cold intolerance, polydipsia, polyphagia and polyuria.   Genitourinary: Negative for dysuria, flank pain, frequency, hematuria and urgency.   Musculoskeletal: Negative for arthralgias, back pain, joint swelling and myalgias.   Skin: Negative for color change, pallor and rash.   Neurological: Negative for tremors, seizures, syncope, weakness and headaches.   Hematological: Negative for adenopathy. Does not bruise/bleed easily.   Psychiatric/Behavioral: Negative for behavioral problems, confusion, dysphoric mood, hallucinations and suicidal ideas. The patient is not nervous/anxious.         /83 (BP Location: Right arm)   Pulse 73   Ht 175.3 cm (69\")   Wt 108 kg (238 lb 3.2 oz)   BMI 35.18 kg/m²     Objective    Physical Exam  Constitutional:       Appearance: He is well-developed.   HENT:      Head: Normocephalic and atraumatic.   Eyes:      Conjunctiva/sclera: Conjunctivae normal.      Pupils: Pupils are equal, round, and reactive to light.   Neck:      Thyroid: No thyromegaly.   Cardiovascular:      Rate and Rhythm: Normal rate and regular rhythm.      Heart sounds: Normal heart sounds. No murmur heard.  Pulmonary:      Effort: Pulmonary effort is normal.      Breath sounds: Normal breath sounds. No wheezing.   Abdominal:      General: Bowel sounds are normal. There is no distension.      Palpations: Abdomen is soft. There is no mass.      Tenderness: There is no abdominal tenderness.      Hernia: No hernia is present.   Genitourinary:     Comments: No lesions noted  Musculoskeletal:         General: No tenderness. Normal range of motion.      Cervical back: Normal range of motion and neck supple.   Lymphadenopathy:      Cervical: No cervical adenopathy. "   Skin:     General: Skin is warm and dry.      Findings: No rash.   Neurological:      Mental Status: He is alert and oriented to person, place, and time.      Cranial Nerves: No cranial nerve deficit.   Psychiatric:         Thought Content: Thought content normal.       Admission on 09/06/2022, Discharged on 09/08/2022   Component Date Value Ref Range Status   • Glucose 09/06/2022 120 (A) 65 - 99 mg/dL Final   • BUN 09/06/2022 8  6 - 20 mg/dL Final   • Creatinine 09/06/2022 0.99  0.76 - 1.27 mg/dL Final   • Sodium 09/06/2022 137  136 - 145 mmol/L Final   • Potassium 09/06/2022 3.7  3.5 - 5.2 mmol/L Final   • Chloride 09/06/2022 102  98 - 107 mmol/L Final   • CO2 09/06/2022 24.0  22.0 - 29.0 mmol/L Final   • Calcium 09/06/2022 9.3  8.6 - 10.5 mg/dL Final   • Total Protein 09/06/2022 7.9  6.0 - 8.5 g/dL Final   • Albumin 09/06/2022 4.40  3.50 - 5.20 g/dL Final   • ALT (SGPT) 09/06/2022 21  1 - 41 U/L Final   • AST (SGOT) 09/06/2022 18  1 - 40 U/L Final   • Alkaline Phosphatase 09/06/2022 72  39 - 117 U/L Final   • Total Bilirubin 09/06/2022 0.5  0.0 - 1.2 mg/dL Final   • Globulin 09/06/2022 3.5  gm/dL Final   • A/G Ratio 09/06/2022 1.3  g/dL Final   • BUN/Creatinine Ratio 09/06/2022 8.1  7.0 - 25.0 Final   • Anion Gap 09/06/2022 11.0  5.0 - 15.0 mmol/L Final   • eGFR 09/06/2022 106.4  >60.0 mL/min/1.73 Final    National Kidney Foundation and American Society of Nephrology (ASN) Task Force recommended calculation based on the Chronic Kidney Disease Epidemiology Collaboration (CKD-EPI) equation refit without adjustment for race.   • Lipase 09/06/2022 14  13 - 60 U/L Final   • Extra Tube 09/06/2022 Hold for add-ons.   Final    Auto resulted.   • Extra Tube 09/06/2022 hold for add-on   Final    Auto resulted   • Extra Tube 09/06/2022 Hold for add-ons.   Final    Auto resulted.   • Extra Tube 09/06/2022 Hold for add-ons.   Final    Auto resulted   • WBC 09/06/2022 17.36 (A) 3.40 - 10.80 10*3/mm3 Final   • RBC 09/06/2022  5.38  4.14 - 5.80 10*6/mm3 Final   • Hemoglobin 09/06/2022 15.8  13.0 - 17.7 g/dL Final   • Hematocrit 09/06/2022 45.3  37.5 - 51.0 % Final   • MCV 09/06/2022 84.2  79.0 - 97.0 fL Final   • MCH 09/06/2022 29.4  26.6 - 33.0 pg Final   • MCHC 09/06/2022 34.9  31.5 - 35.7 g/dL Final   • RDW 09/06/2022 13.2  12.3 - 15.4 % Final   • RDW-SD 09/06/2022 40.1  37.0 - 54.0 fl Final   • MPV 09/06/2022 10.4  6.0 - 12.0 fL Final   • Platelets 09/06/2022 287  140 - 450 10*3/mm3 Final   • Neutrophil % 09/06/2022 86.2 (A) 42.7 - 76.0 % Final   • Lymphocyte % 09/06/2022 5.6 (A) 19.6 - 45.3 % Final   • Monocyte % 09/06/2022 7.4  5.0 - 12.0 % Final   • Eosinophil % 09/06/2022 0.0 (A) 0.3 - 6.2 % Final   • Basophil % 09/06/2022 0.3  0.0 - 1.5 % Final   • Immature Grans % 09/06/2022 0.5  0.0 - 0.5 % Final   • Neutrophils, Absolute 09/06/2022 14.95 (A) 1.70 - 7.00 10*3/mm3 Final   • Lymphocytes, Absolute 09/06/2022 0.98  0.70 - 3.10 10*3/mm3 Final   • Monocytes, Absolute 09/06/2022 1.29 (A) 0.10 - 0.90 10*3/mm3 Final   • Eosinophils, Absolute 09/06/2022 0.00  0.00 - 0.40 10*3/mm3 Final   • Basophils, Absolute 09/06/2022 0.05  0.00 - 0.20 10*3/mm3 Final   • Immature Grans, Absolute 09/06/2022 0.09 (A) 0.00 - 0.05 10*3/mm3 Final   • nRBC 09/06/2022 0.0  0.0 - 0.2 /100 WBC Final   • Extra Tube 09/06/2022 Hold for add-ons.   Final    Auto resulted.   • Magnesium 09/06/2022 1.9  1.6 - 2.6 mg/dL Final   • COVID19 09/06/2022 Not Detected  Not Detected - Ref. Range Final   • Influenza A PCR 09/06/2022 Not Detected  Not Detected Final   • Influenza B PCR 09/06/2022 Not Detected  Not Detected Final   • Blood Culture 09/06/2022 No growth at 5 days   Final   • Blood Culture 09/06/2022 No growth at 5 days   Final   • Lactate 09/06/2022 1.1  0.5 - 2.0 mmol/L Final   • Salmonella/Shigella Screen 09/06/2022 Final report   Final   • Result 1 09/06/2022 Comment   Final    No Salmonella or Shigella recovered.   • Campylobacter Culture 09/06/2022 Final  report   Final   • Result 1 09/06/2022 Comment   Final    No Campylobacter species isolated.   • E coli, Shiga toxin Assay 09/06/2022 Negative  Negative Final   • Calprotectin, Fecal 09/06/2022 358 (A) 0 - 120 ug/g Final    Concentration     Interpretation   Follow-Up  <16 - 50 ug/g     Normal           None  >50 -120 ug/g     Borderline       Re-evaluate in 4-6 weeks      >120 ug/g     Abnormal         Repeat as clinically                                     indicated   • C. Difficile Toxins by PCR 09/06/2022 Negative  Negative Final   • Lactoferrin, Qual 09/06/2022 Positive (A) Negative Final   • Procalcitonin 09/06/2022 0.12  0.00 - 0.25 ng/mL Final   • Color, UA 09/07/2022 Yellow  Yellow, Straw, Dark Yellow, Lilo Final   • Appearance, UA 09/07/2022 Clear  Clear Final   • pH, UA 09/07/2022 6.5  5.0 - 9.0 Final   • Specific Gravity, UA 09/07/2022 1.009  1.003 - 1.030 Final   • Glucose, UA 09/07/2022 Negative  Negative Final   • Ketones, UA 09/07/2022 Negative  Negative Final   • Bilirubin, UA 09/07/2022 Negative  Negative Final   • Blood, UA 09/07/2022 Negative  Negative Final   • Protein, UA 09/07/2022 Negative  Negative Final   • Leuk Esterase, UA 09/07/2022 Negative  Negative Final   • Nitrite, UA 09/07/2022 Negative  Negative Final   • Urobilinogen, UA 09/07/2022 0.2 E.U./dL  0.2 - 1.0 E.U./dL Final   • WBC 09/07/2022 12.80 (A) 3.40 - 10.80 10*3/mm3 Final   • RBC 09/07/2022 4.71  4.14 - 5.80 10*6/mm3 Final   • Hemoglobin 09/07/2022 13.7  13.0 - 17.7 g/dL Final   • Hematocrit 09/07/2022 40.3  37.5 - 51.0 % Final   • MCV 09/07/2022 85.6  79.0 - 97.0 fL Final   • MCH 09/07/2022 29.1  26.6 - 33.0 pg Final   • MCHC 09/07/2022 34.0  31.5 - 35.7 g/dL Final   • RDW 09/07/2022 13.1  12.3 - 15.4 % Final   • RDW-SD 09/07/2022 41.0  37.0 - 54.0 fl Final   • MPV 09/07/2022 10.3  6.0 - 12.0 fL Final   • Platelets 09/07/2022 227  140 - 450 10*3/mm3 Final   • Glucose 09/07/2022 101 (A) 65 - 99 mg/dL Final   • BUN 09/07/2022  9  6 - 20 mg/dL Final   • Creatinine 09/07/2022 0.95  0.76 - 1.27 mg/dL Final   • Sodium 09/07/2022 139  136 - 145 mmol/L Final   • Potassium 09/07/2022 3.8  3.5 - 5.2 mmol/L Final   • Chloride 09/07/2022 102  98 - 107 mmol/L Final   • CO2 09/07/2022 24.0  22.0 - 29.0 mmol/L Final   • Calcium 09/07/2022 8.5 (A) 8.6 - 10.5 mg/dL Final   • Total Protein 09/07/2022 6.1  6.0 - 8.5 g/dL Final   • Albumin 09/07/2022 3.70  3.50 - 5.20 g/dL Final   • ALT (SGPT) 09/07/2022 15  1 - 41 U/L Final   • AST (SGOT) 09/07/2022 12  1 - 40 U/L Final   • Alkaline Phosphatase 09/07/2022 53  39 - 117 U/L Final   • Total Bilirubin 09/07/2022 0.4  0.0 - 1.2 mg/dL Final   • Globulin 09/07/2022 2.4  gm/dL Final   • A/G Ratio 09/07/2022 1.5  g/dL Final   • BUN/Creatinine Ratio 09/07/2022 9.5  7.0 - 25.0 Final   • Anion Gap 09/07/2022 13.0  5.0 - 15.0 mmol/L Final   • eGFR 09/07/2022 111.8  >60.0 mL/min/1.73 Final    National Kidney Foundation and American Society of Nephrology (ASN) Task Force recommended calculation based on the Chronic Kidney Disease Epidemiology Collaboration (CKD-EPI) equation refit without adjustment for race.   • Lactate 09/07/2022 0.8  0.5 - 2.0 mmol/L Final   • Hemoglobin A1C 09/07/2022 5.30  4.80 - 5.60 % Final   • Magnesium 09/07/2022 1.8  1.6 - 2.6 mg/dL Final   • TSH 09/07/2022 1.640  0.270 - 4.200 uIU/mL Final   • Lactate 09/08/2022 1.1  0.5 - 2.0 mmol/L Final   • Blood Culture 09/08/2022 No growth at 5 days   Final   • Extra Tube 09/08/2022 hold for add-on   Final    Auto resulted   • Extra Tube 09/08/2022 hold for add-on   Final    Auto resulted   • Extra Tube 09/08/2022 Hold for add-ons.   Final    Auto resulted.   • Extra Tube 09/08/2022 Hold for add-ons.   Final    Auto resulted.   • Extra Tube 09/08/2022 Hold for add-ons.   Final    Auto resulted     Assessment & Plan      1. Pancolitis (HCC)    2. Nausea    1.  Abdominal pain and nausea, improving slowly.  Continue PPI.  Proceed with EGD for further  evaluation.    2.  Abdominal pain and diarrhea likely due to pancolitis noted upon CT abdomen pelvis.  Proceed with colonoscopy for further evaluation.  Patient completed antibiotic therapy.  Add Bentyl and probiotics.  3.  Obesity, recommend exercise and diet control.  4.  Fatigue, likely due to pancolitis.  Add multivitamin daily.  5.  Tobacco and marijuana usage, recommend cessation.      Orders placed during this encounter include:  Orders Placed This Encounter   Procedures   • Follow Anesthesia Guidelines / Standing Orders     Standing Status:   Future   • Obtain Informed Consent     Standing Status:   Future     Order Specific Question:   Informed Consent Given For     Answer:   egd and colonoscopy       ESOPHAGOGASTRODUODENOSCOPY (N/A), COLONOSCOPY (N/A)    Review and/or summary of lab tests, radiology, procedures, medications. Review and summary of old records and obtaining of history. The risks and benefits of my recommendations, as well as other treatment options were discussed with the patient today. Questions were answered.    New Medications Ordered This Visit   Medications   • dicyclomine (BENTYL) 20 MG tablet     Sig: Take 1 tablet by mouth Every 6 (Six) Hours for 30 days.     Dispense:  120 tablet     Refill:  5       Follow-up: Return in about 1 month (around 10/15/2022).               Results for orders placed or performed during the hospital encounter of 09/06/22   Gray Top   Result Value Ref Range    Extra Tube Hold for add-ons.    Gold Top - SST   Result Value Ref Range    Extra Tube Hold for add-ons.    Gold Top - SST   Result Value Ref Range    Extra Tube Hold for add-ons.    Green Top (Gel)   Result Value Ref Range    Extra Tube Hold for add-ons.    Green Top (Gel)   Result Value Ref Range    Extra Tube Hold for add-ons.    COVID-19 and FLU A/B PCR - Swab, Nasopharynx    Specimen: Nasopharynx; Swab   Result Value Ref Range    COVID19 Not Detected Not Detected - Ref. Range    Influenza A PCR  Not Detected Not Detected    Influenza B PCR Not Detected Not Detected   Calprotectin, Fecal - Stool, Per Rectum    Specimen: Per Rectum; Stool   Result Value Ref Range    Calprotectin, Fecal 358 (H) 0 - 120 ug/g   Urinalysis With Microscopic If Indicated (No Culture) - Urine, Clean Catch    Specimen: Urine, Clean Catch   Result Value Ref Range    Color, UA Yellow Yellow, Straw, Dark Yellow, Lilo    Appearance, UA Clear Clear    pH, UA 6.5 5.0 - 9.0    Specific Gravity, UA 1.009 1.003 - 1.030    Glucose, UA Negative Negative    Ketones, UA Negative Negative    Bilirubin, UA Negative Negative    Blood, UA Negative Negative    Protein, UA Negative Negative    Leuk Esterase, UA Negative Negative    Nitrite, UA Negative Negative    Urobilinogen, UA 0.2 E.U./dL 0.2 - 1.0 E.U./dL   Procalcitonin    Specimen: Blood   Result Value Ref Range    Procalcitonin 0.12 0.00 - 0.25 ng/mL   CBC Auto Differential    Specimen: Blood   Result Value Ref Range    WBC 17.36 (H) 3.40 - 10.80 10*3/mm3    RBC 5.38 4.14 - 5.80 10*6/mm3    Hemoglobin 15.8 13.0 - 17.7 g/dL    Hematocrit 45.3 37.5 - 51.0 %    MCV 84.2 79.0 - 97.0 fL    MCH 29.4 26.6 - 33.0 pg    MCHC 34.9 31.5 - 35.7 g/dL    RDW 13.2 12.3 - 15.4 %    RDW-SD 40.1 37.0 - 54.0 fl    MPV 10.4 6.0 - 12.0 fL    Platelets 287 140 - 450 10*3/mm3    Neutrophil % 86.2 (H) 42.7 - 76.0 %    Lymphocyte % 5.6 (L) 19.6 - 45.3 %    Monocyte % 7.4 5.0 - 12.0 %    Eosinophil % 0.0 (L) 0.3 - 6.2 %    Basophil % 0.3 0.0 - 1.5 %    Immature Grans % 0.5 0.0 - 0.5 %    Neutrophils, Absolute 14.95 (H) 1.70 - 7.00 10*3/mm3    Lymphocytes, Absolute 0.98 0.70 - 3.10 10*3/mm3    Monocytes, Absolute 1.29 (H) 0.10 - 0.90 10*3/mm3    Eosinophils, Absolute 0.00 0.00 - 0.40 10*3/mm3    Basophils, Absolute 0.05 0.00 - 0.20 10*3/mm3    Immature Grans, Absolute 0.09 (H) 0.00 - 0.05 10*3/mm3    nRBC 0.0 0.0 - 0.2 /100 WBC   Lavender Top   Result Value Ref Range    Extra Tube hold for add-on    Lavender Top    Result Value Ref Range    Extra Tube hold for add-on    Lavender Top   Result Value Ref Range    Extra Tube hold for add-on    Light Blue Top   Result Value Ref Range    Extra Tube Hold for add-ons.    Light Blue Top   Result Value Ref Range    Extra Tube Hold for add-ons.    Fecal Lactoferrin Qual. - Stool, Per Rectum    Specimen: Per Rectum; Stool   Result Value Ref Range    Lactoferrin, Qual Positive (A) Negative   Blood Culture - Blood, Arm, Left    Specimen: Arm, Left; Blood   Result Value Ref Range    Blood Culture No growth at 5 days    Blood Culture - Blood, Arm, Right    Specimen: Arm, Right; Blood   Result Value Ref Range    Blood Culture No growth at 5 days    Blood Culture - Blood, Hand, Left    Specimen: Hand, Left; Blood   Result Value Ref Range    Blood Culture No growth at 5 days    CBC (No Diff)    Specimen: Blood   Result Value Ref Range    WBC 12.80 (H) 3.40 - 10.80 10*3/mm3    RBC 4.71 4.14 - 5.80 10*6/mm3    Hemoglobin 13.7 13.0 - 17.7 g/dL    Hematocrit 40.3 37.5 - 51.0 %    MCV 85.6 79.0 - 97.0 fL    MCH 29.1 26.6 - 33.0 pg    MCHC 34.0 31.5 - 35.7 g/dL    RDW 13.1 12.3 - 15.4 %    RDW-SD 41.0 37.0 - 54.0 fl    MPV 10.3 6.0 - 12.0 fL    Platelets 227 140 - 450 10*3/mm3   Clostridioides difficile Toxin, PCR - Stool, Per Rectum    Specimen: Per Rectum; Stool   Result Value Ref Range    C. Difficile Toxins by PCR Negative Negative   Stool Culture (Reference Lab) - Stool, Per Rectum    Specimen: Per Rectum; Stool   Result Value Ref Range    Salmonella/Shigella Screen Final report     Result 1 Comment     Campylobacter Culture Final report     Result 1 Comment     E coli, Shiga toxin Assay Negative Negative   TSH    Specimen: Blood   Result Value Ref Range    TSH 1.640 0.270 - 4.200 uIU/mL   Magnesium    Specimen: Blood   Result Value Ref Range    Magnesium 1.8 1.6 - 2.6 mg/dL   Magnesium    Specimen: Blood   Result Value Ref Range    Magnesium 1.9 1.6 - 2.6 mg/dL   Lipase    Specimen: Blood    Result Value Ref Range    Lipase 14 13 - 60 U/L   Lactic Acid, Plasma    Specimen: Arm, Left; Blood   Result Value Ref Range    Lactate 1.1 0.5 - 2.0 mmol/L   Lactic Acid, Plasma    Specimen: Blood   Result Value Ref Range    Lactate 0.8 0.5 - 2.0 mmol/L   Lactic Acid, Plasma    Specimen: Blood   Result Value Ref Range    Lactate 1.1 0.5 - 2.0 mmol/L   Hemoglobin A1c    Specimen: Blood   Result Value Ref Range    Hemoglobin A1C 5.30 4.80 - 5.60 %   Comprehensive Metabolic Panel    Specimen: Blood   Result Value Ref Range    Glucose 101 (H) 65 - 99 mg/dL    BUN 9 6 - 20 mg/dL    Creatinine 0.95 0.76 - 1.27 mg/dL    Sodium 139 136 - 145 mmol/L    Potassium 3.8 3.5 - 5.2 mmol/L    Chloride 102 98 - 107 mmol/L    CO2 24.0 22.0 - 29.0 mmol/L    Calcium 8.5 (L) 8.6 - 10.5 mg/dL    Total Protein 6.1 6.0 - 8.5 g/dL    Albumin 3.70 3.50 - 5.20 g/dL    ALT (SGPT) 15 1 - 41 U/L    AST (SGOT) 12 1 - 40 U/L    Alkaline Phosphatase 53 39 - 117 U/L    Total Bilirubin 0.4 0.0 - 1.2 mg/dL    Globulin 2.4 gm/dL    A/G Ratio 1.5 g/dL    BUN/Creatinine Ratio 9.5 7.0 - 25.0    Anion Gap 13.0 5.0 - 15.0 mmol/L    eGFR 111.8 >60.0 mL/min/1.73   Comprehensive Metabolic Panel    Specimen: Blood   Result Value Ref Range    Glucose 120 (H) 65 - 99 mg/dL    BUN 8 6 - 20 mg/dL    Creatinine 0.99 0.76 - 1.27 mg/dL    Sodium 137 136 - 145 mmol/L    Potassium 3.7 3.5 - 5.2 mmol/L    Chloride 102 98 - 107 mmol/L    CO2 24.0 22.0 - 29.0 mmol/L    Calcium 9.3 8.6 - 10.5 mg/dL    Total Protein 7.9 6.0 - 8.5 g/dL    Albumin 4.40 3.50 - 5.20 g/dL    ALT (SGPT) 21 1 - 41 U/L    AST (SGOT) 18 1 - 40 U/L    Alkaline Phosphatase 72 39 - 117 U/L    Total Bilirubin 0.5 0.0 - 1.2 mg/dL    Globulin 3.5 gm/dL    A/G Ratio 1.3 g/dL    BUN/Creatinine Ratio 8.1 7.0 - 25.0    Anion Gap 11.0 5.0 - 15.0 mmol/L    eGFR 106.4 >60.0 mL/min/1.73     *Note: Due to a large number of results and/or encounters for the requested time period, some results have not been  displayed. A complete set of results can be found in Results Review.         This document has been electronically signed by Jame Cobos MD on October 3, 2022 07:12 CDT

## 2022-10-07 ENCOUNTER — ANESTHESIA (OUTPATIENT)
Dept: GASTROENTEROLOGY | Facility: HOSPITAL | Age: 27
End: 2022-10-07

## 2022-10-07 ENCOUNTER — HOSPITAL ENCOUNTER (OUTPATIENT)
Facility: HOSPITAL | Age: 27
Setting detail: HOSPITAL OUTPATIENT SURGERY
Discharge: HOME | End: 2022-10-07
Attending: INTERNAL MEDICINE | Admitting: INTERNAL MEDICINE

## 2022-10-07 ENCOUNTER — ANESTHESIA EVENT (OUTPATIENT)
Dept: GASTROENTEROLOGY | Facility: HOSPITAL | Age: 27
End: 2022-10-07

## 2022-10-07 VITALS
SYSTOLIC BLOOD PRESSURE: 111 MMHG | WEIGHT: 235 LBS | OXYGEN SATURATION: 98 % | HEART RATE: 84 BPM | HEIGHT: 69 IN | TEMPERATURE: 97.3 F | BODY MASS INDEX: 34.8 KG/M2 | RESPIRATION RATE: 16 BRPM | DIASTOLIC BLOOD PRESSURE: 61 MMHG

## 2022-10-07 DIAGNOSIS — K51.00 PANCOLITIS: ICD-10-CM

## 2022-10-07 DIAGNOSIS — R11.0 NAUSEA: ICD-10-CM

## 2022-10-07 PROCEDURE — 25010000002 PROPOFOL 10 MG/ML EMULSION: Performed by: NURSE ANESTHETIST, CERTIFIED REGISTERED

## 2022-10-07 PROCEDURE — 45380 COLONOSCOPY AND BIOPSY: CPT | Performed by: INTERNAL MEDICINE

## 2022-10-07 PROCEDURE — 43239 EGD BIOPSY SINGLE/MULTIPLE: CPT | Performed by: INTERNAL MEDICINE

## 2022-10-07 RX ORDER — LIDOCAINE HYDROCHLORIDE 20 MG/ML
INJECTION, SOLUTION INFILTRATION; PERINEURAL AS NEEDED
Status: DISCONTINUED | OUTPATIENT
Start: 2022-10-07 | End: 2022-10-07 | Stop reason: SURG

## 2022-10-07 RX ORDER — DEXTROSE AND SODIUM CHLORIDE 5; .45 G/100ML; G/100ML
30 INJECTION, SOLUTION INTRAVENOUS CONTINUOUS PRN
Status: DISCONTINUED | OUTPATIENT
Start: 2022-10-07 | End: 2022-10-07 | Stop reason: HOSPADM

## 2022-10-07 RX ORDER — PROPOFOL 10 MG/ML
VIAL (ML) INTRAVENOUS AS NEEDED
Status: DISCONTINUED | OUTPATIENT
Start: 2022-10-07 | End: 2022-10-07 | Stop reason: SURG

## 2022-10-07 RX ADMIN — PROPOFOL 20 MG: 10 INJECTION, EMULSION INTRAVENOUS at 14:18

## 2022-10-07 RX ADMIN — PROPOFOL 20 MG: 10 INJECTION, EMULSION INTRAVENOUS at 14:13

## 2022-10-07 RX ADMIN — PROPOFOL 30 MG: 10 INJECTION, EMULSION INTRAVENOUS at 14:16

## 2022-10-07 RX ADMIN — PROPOFOL 120 MG: 10 INJECTION, EMULSION INTRAVENOUS at 14:08

## 2022-10-07 RX ADMIN — PROPOFOL 40 MG: 10 INJECTION, EMULSION INTRAVENOUS at 14:11

## 2022-10-07 RX ADMIN — LIDOCAINE HYDROCHLORIDE 100 MG: 20 INJECTION, SOLUTION INFILTRATION; PERINEURAL at 14:08

## 2022-10-07 RX ADMIN — DEXTROSE AND SODIUM CHLORIDE 30 ML/HR: 5; 450 INJECTION, SOLUTION INTRAVENOUS at 13:55

## 2022-10-07 NOTE — ANESTHESIA POSTPROCEDURE EVALUATION
Patient: Jaylen Benson    Procedure Summary     Date: 10/07/22 Room / Location: [facility] ENDOSCOPY 1 / [facility] ENDOSCOPY    Anesthesia Start: 1358 Anesthesia Stop: 1419    Procedures:       ESOPHAGOGASTRODUODENOSCOPY (N/A )      COLONOSCOPY (N/A ) Diagnosis:       Pancolitis (HCC)      Nausea      (Pancolitis (HCC) [K51.00])      (Nausea [R11.0])    Surgeons: Jame Cobos MD Provider: Paul Moreno CRNA    Anesthesia Type: general ASA Status: 3          Anesthesia Type: general    Vitals  No vitals data found for the desired time range.          Post Anesthesia Care and Evaluation    Patient location during evaluation: bedside  Patient participation: complete - patient participated  Level of consciousness: sleepy but conscious  Pain score: 0  Pain management: adequate    Airway patency: patent  Anesthetic complications: No anesthetic complications  PONV Status: none  Cardiovascular status: acceptable  Respiratory status: acceptable  Hydration status: acceptable    Comments: ---------------------------               10/07/22                      1420        ---------------------------   BP:          115/63         Pulse:         91           Resp:          18           Temp:   96.9 °F (36.1 °C)   SpO2:          98%         ---------------------------

## 2022-10-07 NOTE — ANESTHESIA PREPROCEDURE EVALUATION
Anesthesia Evaluation     Patient summary reviewed and Nursing notes reviewed   NPO Solid Status: > 8 hours  NPO Liquid Status: > 4 hours           Airway   Mallampati: II  No difficulty expected  Dental - normal exam     Pulmonary - normal exam   (+) a smoker Current Smoked day of surgery,   Cardiovascular     Rhythm: regular  Rate: normal        Neuro/Psych- negative ROS  GI/Hepatic/Renal/Endo      ROS Comment: Pancolitis    Musculoskeletal (-) negative ROS    Abdominal    Substance History - negative use     OB/GYN          Other - negative ROS                       Anesthesia Plan    ASA 3     general     intravenous induction     Anesthetic plan, risks, benefits, and alternatives have been provided, discussed and informed consent has been obtained with: patient.    Plan discussed with CRNA.        CODE STATUS:

## 2022-10-11 LAB — REF LAB TEST METHOD: NORMAL

## 2022-10-20 ENCOUNTER — OFFICE VISIT (OUTPATIENT)
Dept: GASTROENTEROLOGY | Facility: CLINIC | Age: 27
End: 2022-10-20

## 2022-10-20 VITALS
HEIGHT: 69 IN | WEIGHT: 240.6 LBS | SYSTOLIC BLOOD PRESSURE: 126 MMHG | DIASTOLIC BLOOD PRESSURE: 70 MMHG | HEART RATE: 88 BPM | BODY MASS INDEX: 35.63 KG/M2

## 2022-10-20 DIAGNOSIS — R19.7 DIARRHEA, UNSPECIFIED TYPE: Primary | ICD-10-CM

## 2022-10-20 PROCEDURE — 99213 OFFICE O/P EST LOW 20 MIN: CPT | Performed by: INTERNAL MEDICINE

## 2022-10-20 RX ORDER — OMEPRAZOLE 40 MG/1
40 CAPSULE, DELAYED RELEASE ORAL DAILY
Qty: 30 CAPSULE | Refills: 11 | Status: SHIPPED | OUTPATIENT
Start: 2022-10-20

## 2022-10-20 RX ORDER — DICYCLOMINE HCL 20 MG
20 TABLET ORAL EVERY 6 HOURS
Qty: 120 TABLET | Refills: 5 | Status: SHIPPED | OUTPATIENT
Start: 2022-10-20 | End: 2022-11-19

## 2022-11-06 NOTE — PROGRESS NOTES
Chief Complaint   Patient presents with   • Follow-up       Subjective    Jaylen Benson is a 27 y.o. male.    History of Present Illness  Patient presented to GI clinic for follow-up visit today.  Feels some better currently.  Has intermittent diarrhea with 4-5 soft to watery stools per day.  Abdominal pain has improved.  No further nausea or emesis.  Weight is stable.  EGD was consistent with esophagitis and gastritis.  Path was consistent with reactive gastropathy.  Colonoscopy was consistent with hemorrhoids.  Path was unremarkable.       The following portions of the patient's history were reviewed and updated as appropriate:   History reviewed. No pertinent past medical history.  Past Surgical History:   Procedure Laterality Date   • COLONOSCOPY N/A 10/7/2022    Procedure: COLONOSCOPY;  Surgeon: Jame Cobos MD;  Location: [facility] ENDOSCOPY;  Service: Gastroenterology;  Laterality: N/A;   • ENDOSCOPY N/A 10/7/2022    Procedure: ESOPHAGOGASTRODUODENOSCOPY;  Surgeon: Jame Cobos MD;  Location: [facility] ENDOSCOPY;  Service: Gastroenterology;  Laterality: N/A;     Family History   Problem Relation Age of Onset   • Crohn's disease Paternal Grandmother        Prior to Admission medications    Medication Sig Start Date End Date Taking? Authorizing Provider   acetaminophen (TYLENOL) 325 MG tablet Take 2 tablets by mouth Every 6 (Six) Hours As Needed for Mild Pain. 9/8/22  Yes Jose Holguin MD   dicyclomine (BENTYL) 20 MG tablet Take 1 tablet by mouth Every 6 (Six) Hours for 30 days. 10/20/22 11/19/22  Jame Cobos MD   omeprazole (priLOSEC) 40 MG capsule Take 1 capsule by mouth Daily. 10/20/22   Jame Cobos MD     No Known Allergies  Social History     Socioeconomic History   • Marital status:    Tobacco Use   • Smoking status: Every Day   • Smokeless tobacco: Current   • Tobacco comments:     nictine pouches   Vaping Use   • Vaping Use: Former   • Substances:  "Nicotine   • Devices: Disposable, Pre-filled or refillable cartridge, Refillable tank, Pre-filled pod   Substance and Sexual Activity   • Alcohol use: Yes     Comment: socially   • Drug use: Yes     Types: Marijuana   • Sexual activity: Yes       Review of Systems  Review of Systems   Constitutional: Negative for chills, fatigue, fever and unexpected weight change.   HENT: Negative for congestion, ear discharge, hearing loss, nosebleeds and sore throat.    Eyes: Negative for pain, discharge and redness.   Respiratory: Negative for cough, chest tightness, shortness of breath and wheezing.    Cardiovascular: Negative for chest pain and palpitations.   Gastrointestinal: Positive for diarrhea. Negative for abdominal distention, abdominal pain, blood in stool, constipation, nausea and vomiting.   Endocrine: Negative for cold intolerance, polydipsia, polyphagia and polyuria.   Genitourinary: Negative for dysuria, flank pain, frequency, hematuria and urgency.   Musculoskeletal: Negative for arthralgias, back pain, joint swelling and myalgias.   Skin: Negative for color change, pallor and rash.   Neurological: Negative for tremors, seizures, syncope, weakness and headaches.   Hematological: Negative for adenopathy. Does not bruise/bleed easily.   Psychiatric/Behavioral: Negative for behavioral problems, confusion, dysphoric mood, hallucinations and suicidal ideas. The patient is not nervous/anxious.         /70 (BP Location: Left arm)   Pulse 88   Ht 175.3 cm (69\")   Wt 109 kg (240 lb 9.6 oz)   BMI 35.53 kg/m²     Objective    Physical Exam  Constitutional:       Appearance: He is well-developed.   HENT:      Head: Normocephalic and atraumatic.   Eyes:      Conjunctiva/sclera: Conjunctivae normal.      Pupils: Pupils are equal, round, and reactive to light.   Neck:      Thyroid: No thyromegaly.   Cardiovascular:      Rate and Rhythm: Normal rate and regular rhythm.      Heart sounds: Normal heart sounds. No murmur " heard.  Pulmonary:      Effort: Pulmonary effort is normal.      Breath sounds: Normal breath sounds. No wheezing.   Abdominal:      General: Bowel sounds are normal. There is no distension.      Palpations: Abdomen is soft. There is no mass.      Tenderness: There is no abdominal tenderness.      Hernia: No hernia is present.   Genitourinary:     Comments: No lesions noted  Musculoskeletal:         General: No tenderness. Normal range of motion.      Cervical back: Normal range of motion and neck supple.   Lymphadenopathy:      Cervical: No cervical adenopathy.   Skin:     General: Skin is warm and dry.      Findings: No rash.   Neurological:      Mental Status: He is alert and oriented to person, place, and time.      Cranial Nerves: No cranial nerve deficit.   Psychiatric:         Thought Content: Thought content normal.       Admission on 10/07/2022, Discharged on 10/07/2022   Component Date Value Ref Range Status   • Reference Lab Report 10/07/2022    Final                    Value:[facility]  [address] [place] [address]  Phone: [phone] or [phone]  Fax: [phone]  Greg Curtis M.D., Medical Director    PATIENT NAME                           LABORATORY NO.  NENA LUNA.              [id number]  [id number]                         AGE              SEX  N           CLIENT REF #  [facility]           27      1995      [id number]   [id number]    [place]                       REQUESTING M.D.     ATTENDING M.D.     [address]  DATE COLLECTED      DATE RECEIVED      DATE REPORTED  10/07/2022          10/10/2022         10/11/2022    DIAGNOSIS:  A.   ESOPHAGUS, BIOPSY:  Reactive gastric mucosa  Negative for specialized Gross's mucosa  B.   GASTRIC ANTRUM, BIOPSY:  Reactive gastropathy  C.   DUODENUM,  "BIOPSY:  No significant histologic abnormality  D.                             SMALL BOWEL, ILEUM BIOPSY:  No significant histologic abnormality  E.   COLON, BIOPSY:  No significant histologic abnormality    JBS/sm    CLINICAL HISTORY:  Pancolitis  Nausea    SPECIMENS RECEIVED:  A.  ESOPHAGUS, BIOPSY  B.  GASTRIC ANTRUM, BIOPSY  C.  DUODENUM, BIOPSY  D.  SMALL BOWEL, ILEUM BIOPSY  E.  COLON, BIOPSY    MICROSCOPIC DESCRIPTION:  Tissue blocks are prepared and slides are examined microscopically on all  specimens. See diagnosis for details.    Professional interpretation rendered by Dong Treviño M.D. at [facility], [address] [place] [address].    GROSS DESCRIPTION:  A.  Specimen is received in 1 formalin filled container labeled \"esophagus\"  and consists of a single portion of tan soft tissue measuring 0.3 x 0.3 x 0.2  cm.  Submitted entirely in 1 cassette.  MTH  B.  Specimen is received in 1 formalin filled container labeled \"gastric,  antrum\" and consists of 2 portions of tan soft tissue measuring 0.6 x 0.6 x  0.2 cm in aggregate.                            Submitted entirely in 1 cassette.  C.  Specimen is received in 1 formalin filled container labeled \"duodenum\"  and consists of 3 portions of tan soft tissue measuring 0.4 x 0.4 x 0.2 cm in  aggregate.  Submitted entirely in 1 cassette.  D.  Specimen is received in 1 formalin filled container labeled \"small intestine,  ileum\" and consists of 3 portions of tan soft tissue measuring 0.6 x 0.5 x  0.2 cm in aggregate.  Submitted entirely in 1 cassette.  E.  Specimen is received in 1 formalin filled container labeled \"large intestine\"  and consists of 2 portions of tan soft tissue measuring 0.5 x 0.4 x 0.3 cm in  aggregate.  Submitted entirely in 1 cassette.    REVIEWED, DIAGNOSED AND ELECTRONICALLY  SIGNED BY:    Dong Treviño M.D.  CPT CODES:  88305x5       Assessment & Plan    No diagnosis found..   1.  Diarrhea, likely postinflammatory " irritable bowel syndrome.  Continue Bentyl and add probiotics.  2.  Abdominal pain with nausea and vomiting, resolved.  3.  Obesity, recommend exercise and diet control.  4.  Tobacco and marijuana usage, recommend cessation.    Orders placed during this encounter include:  No orders of the defined types were placed in this encounter.      * Surgery not found *    Review and/or summary of lab tests, radiology, procedures, medications. Review and summary of old records and obtaining of history. The risks and benefits of my recommendations, as well as other treatment options were discussed with the patient today. Questions were answered.    New Medications Ordered This Visit   Medications   • dicyclomine (BENTYL) 20 MG tablet     Sig: Take 1 tablet by mouth Every 6 (Six) Hours for 30 days.     Dispense:  120 tablet     Refill:  5   • omeprazole (priLOSEC) 40 MG capsule     Sig: Take 1 capsule by mouth Daily.     Dispense:  30 capsule     Refill:  11       Follow-up: Return in about 1 month (around 2022).               Results for orders placed or performed during the hospital encounter of 10/07/22   TISSUE EXAM, [facility])    Specimen: A: Esophagus; Tissue    B: Gastric, Antrum; Tissue    C: Small Intestine, Duodenum; Tissue    D: Small Intestine, Ileum; Tissue    E: Large Intestine; Tissue   Result Value Ref Range    Reference Lab Report       [facility]  [address] [place] [address]  Phone: [phone] or [phone]  Fax: [phone]  Greg Curtis M.D., Medical Director    PATIENT NAME                           LABORATORY NO.  NENA LUNA.              [id number]  [id number]                         AGE              SEX  N           CLIENT REF #  [facility]           27      1995  M    [id number]   [id number]    [place]                       REQUESTING M.D.     ATTENDING M.D.     COPY TO[address]  "[facility]                 [place] [address]             SUMALATHA  DATE COLLECTED      DATE RECEIVED      DATE REPORTED  10/07/2022          10/10/2022         10/11/2022    DIAGNOSIS:  A.   ESOPHAGUS, BIOPSY:  Reactive gastric mucosa  Negative for specialized Gross's mucosa  B.   GASTRIC ANTRUM, BIOPSY:  Reactive gastropathy  C.   DUODENUM, BIOPSY:  No significant histologic abnormality  D.    SMALL BOWEL, ILEUM BIOPSY:  No significant histologic abnormality  E.   COLON, BIOPSY:  No significant histologic abnormality    JBS/sm    CLINICAL HISTORY:  Pancolitis  Nausea    SPECIMENS RECEIVED:  A.  ESOPHAGUS, BIOPSY  B.  GASTRIC ANTRUM, BIOPSY  C.  DUODENUM, BIOPSY  D.  SMALL BOWEL, ILEUM BIOPSY  E.  COLON, BIOPSY    MICROSCOPIC DESCRIPTION:  Tissue blocks are prepared and slides are examined microscopically on all  specimens. See diagnosis for details.    Professional interpretation rendered by Dong Treviño M.D. at [facility],  [employer], [address] [place] [address] , [place] [address].    GROSS DESCRIPTION:  A.  Specimen is received in 1 formalin filled container labeled \"esophagus\"  and consists of a single portion of tan soft tissue measuring 0.3 x 0.3 x 0.2  cm.  Submitted entirely in 1 cassette.  MTH  B.  Specimen is received in 1 formalin filled container labeled \"gastric,  antrum\" and consists of 2 portions of tan soft tissue measuring 0.6 x 0.6 x  0.2 cm in aggregate.   Submitted entirely in 1 cassette.  C.  Specimen is received in 1 formalin filled container labeled \"duodenum\"  and consists of 3 portions of tan soft tissue measuring 0.4 x 0.4 x 0.2 cm in  aggregate.  Submitted entirely in 1 cassette.  D.  Specimen is received in 1 formalin filled container labeled \"small intestine,  ileum\" and consists of 3 portions of tan soft tissue measuring 0.6 x 0.5 x  0.2 cm in aggregate.  Submitted entirely in 1 cassette.  E.  Specimen is received in 1 formalin filled container labeled " "\"large intestine\"  and consists of 2 portions of tan soft tissue measuring 0.5 x 0.4 x 0.3 cm in  aggregate.  Submitted entirely in 1 cassette.    REVIEWED, DIAGNOSED AND ELECTRONICALLY  SIGNED BY:    Dong Treviño M.D.  CPT CODES:  88305x5     Results for orders placed or performed during the hospital encounter of 09/06/22   Gray Top   Result Value Ref Range    Extra Tube Hold for add-ons.    Gold Top - SST   Result Value Ref Range    Extra Tube Hold for add-ons.    Gold Top - SST   Result Value Ref Range    Extra Tube Hold for add-ons.    Green Top (Gel)   Result Value Ref Range    Extra Tube Hold for add-ons.    Green Top (Gel)   Result Value Ref Range    Extra Tube Hold for add-ons.    COVID-19 and FLU A/B PCR - Swab, Nasopharynx    Specimen: Nasopharynx; Swab   Result Value Ref Range    COVID19 Not Detected Not Detected - Ref. Range    Influenza A PCR Not Detected Not Detected    Influenza B PCR Not Detected Not Detected   Calprotectin, Fecal - Stool, Per Rectum    Specimen: Per Rectum; Stool   Result Value Ref Range    Calprotectin, Fecal 358 (H) 0 - 120 ug/g   Urinalysis With Microscopic If Indicated (No Culture) - Urine, Clean Catch    Specimen: Urine, Clean Catch   Result Value Ref Range    Color, UA Yellow Yellow, Straw, Dark Yellow, Lilo    Appearance, UA Clear Clear    pH, UA 6.5 5.0 - 9.0    Specific Gravity, UA 1.009 1.003 - 1.030    Glucose, UA Negative Negative    Ketones, UA Negative Negative    Bilirubin, UA Negative Negative    Blood, UA Negative Negative    Protein, UA Negative Negative    Leuk Esterase, UA Negative Negative    Nitrite, UA Negative Negative    Urobilinogen, UA 0.2 E.U./dL 0.2 - 1.0 E.U./dL   Procalcitonin    Specimen: Blood   Result Value Ref Range    Procalcitonin 0.12 0.00 - 0.25 ng/mL   CBC Auto Differential    Specimen: Blood   Result Value Ref Range    WBC 17.36 (H) 3.40 - 10.80 10*3/mm3    RBC 5.38 4.14 - 5.80 10*6/mm3    Hemoglobin 15.8 13.0 - 17.7 g/dL    Hematocrit " 45.3 37.5 - 51.0 %    MCV 84.2 79.0 - 97.0 fL    MCH 29.4 26.6 - 33.0 pg    MCHC 34.9 31.5 - 35.7 g/dL    RDW 13.2 12.3 - 15.4 %    RDW-SD 40.1 37.0 - 54.0 fl    MPV 10.4 6.0 - 12.0 fL    Platelets 287 140 - 450 10*3/mm3    Neutrophil % 86.2 (H) 42.7 - 76.0 %    Lymphocyte % 5.6 (L) 19.6 - 45.3 %    Monocyte % 7.4 5.0 - 12.0 %    Eosinophil % 0.0 (L) 0.3 - 6.2 %    Basophil % 0.3 0.0 - 1.5 %    Immature Grans % 0.5 0.0 - 0.5 %    Neutrophils, Absolute 14.95 (H) 1.70 - 7.00 10*3/mm3    Lymphocytes, Absolute 0.98 0.70 - 3.10 10*3/mm3    Monocytes, Absolute 1.29 (H) 0.10 - 0.90 10*3/mm3    Eosinophils, Absolute 0.00 0.00 - 0.40 10*3/mm3    Basophils, Absolute 0.05 0.00 - 0.20 10*3/mm3    Immature Grans, Absolute 0.09 (H) 0.00 - 0.05 10*3/mm3    nRBC 0.0 0.0 - 0.2 /100 WBC   Lavender Top   Result Value Ref Range    Extra Tube hold for add-on    Lavender Top   Result Value Ref Range    Extra Tube hold for add-on    Lavender Top   Result Value Ref Range    Extra Tube hold for add-on    Light Blue Top   Result Value Ref Range    Extra Tube Hold for add-ons.    Light Blue Top   Result Value Ref Range    Extra Tube Hold for add-ons.    Fecal Lactoferrin Qual. - Stool, Per Rectum    Specimen: Per Rectum; Stool   Result Value Ref Range    Lactoferrin, Qual Positive (A) Negative   Blood Culture - Blood, Arm, Left    Specimen: Arm, Left; Blood   Result Value Ref Range    Blood Culture No growth at 5 days    Blood Culture - Blood, Arm, Right    Specimen: Arm, Right; Blood   Result Value Ref Range    Blood Culture No growth at 5 days    Blood Culture - Blood, Hand, Left    Specimen: Hand, Left; Blood   Result Value Ref Range    Blood Culture No growth at 5 days    CBC (No Diff)    Specimen: Blood   Result Value Ref Range    WBC 12.80 (H) 3.40 - 10.80 10*3/mm3    RBC 4.71 4.14 - 5.80 10*6/mm3    Hemoglobin 13.7 13.0 - 17.7 g/dL    Hematocrit 40.3 37.5 - 51.0 %    MCV 85.6 79.0 - 97.0 fL    MCH 29.1 26.6 - 33.0 pg    MCHC 34.0 31.5  - 35.7 g/dL    RDW 13.1 12.3 - 15.4 %    RDW-SD 41.0 37.0 - 54.0 fl    MPV 10.3 6.0 - 12.0 fL    Platelets 227 140 - 450 10*3/mm3   Clostridioides difficile Toxin, PCR - Stool, Per Rectum    Specimen: Per Rectum; Stool   Result Value Ref Range    C. Difficile Toxins by PCR Negative Negative   Stool Culture (Reference Lab) - Stool, Per Rectum    Specimen: Per Rectum; Stool   Result Value Ref Range    Salmonella/Shigella Screen Final report     Result 1 Comment     Campylobacter Culture Final report     Result 1 Comment     E coli, Shiga toxin Assay Negative Negative   TSH    Specimen: Blood   Result Value Ref Range    TSH 1.640 0.270 - 4.200 uIU/mL   Magnesium    Specimen: Blood   Result Value Ref Range    Magnesium 1.8 1.6 - 2.6 mg/dL   Magnesium    Specimen: Blood   Result Value Ref Range    Magnesium 1.9 1.6 - 2.6 mg/dL   Lipase    Specimen: Blood   Result Value Ref Range    Lipase 14 13 - 60 U/L   Lactic Acid, Plasma    Specimen: Arm, Left; Blood   Result Value Ref Range    Lactate 1.1 0.5 - 2.0 mmol/L   Lactic Acid, Plasma    Specimen: Blood   Result Value Ref Range    Lactate 0.8 0.5 - 2.0 mmol/L   Lactic Acid, Plasma    Specimen: Blood   Result Value Ref Range    Lactate 1.1 0.5 - 2.0 mmol/L   Hemoglobin A1c    Specimen: Blood   Result Value Ref Range    Hemoglobin A1C 5.30 4.80 - 5.60 %   Comprehensive Metabolic Panel    Specimen: Blood   Result Value Ref Range    Glucose 101 (H) 65 - 99 mg/dL    BUN 9 6 - 20 mg/dL    Creatinine 0.95 0.76 - 1.27 mg/dL    Sodium 139 136 - 145 mmol/L    Potassium 3.8 3.5 - 5.2 mmol/L    Chloride 102 98 - 107 mmol/L    CO2 24.0 22.0 - 29.0 mmol/L    Calcium 8.5 (L) 8.6 - 10.5 mg/dL    Total Protein 6.1 6.0 - 8.5 g/dL    Albumin 3.70 3.50 - 5.20 g/dL    ALT (SGPT) 15 1 - 41 U/L    AST (SGOT) 12 1 - 40 U/L    Alkaline Phosphatase 53 39 - 117 U/L    Total Bilirubin 0.4 0.0 - 1.2 mg/dL    Globulin 2.4 gm/dL    A/G Ratio 1.5 g/dL    BUN/Creatinine Ratio 9.5 7.0 - 25.0    Anion Gap  13.0 5.0 - 15.0 mmol/L    eGFR 111.8 >60.0 mL/min/1.73     *Note: Due to a large number of results and/or encounters for the requested time period, some results have not been displayed. A complete set of results can be found in Results Review.         This document has been electronically signed by Jame Cobos MD on November 5, 2022 21:20 CDT

## 2023-07-21 NOTE — PLAN OF CARE
Goal Outcome Evaluation:      VSS. Pt has no c/o n/v or pain at rest. Pt reports he has very mild pain with BM but is greatly improved. Afebrile. Pt tolerated jello and pudding today and chicken broth from home. Extra labs collected and pt to see GI in a week. Md evaluated and cleared for D/c.             Marcellus Mendez called to fax of form medical supplies fax number was confirmed Wound Care: [place]

## (undated) DEVICE — SINGLE-USE BIOPSY FORCEPS: Brand: RADIAL JAW 4

## (undated) DEVICE — BITEBLOCK ENDO W/STRAP 60F A/ LF DISP